# Patient Record
Sex: MALE | Race: WHITE | Employment: FULL TIME | URBAN - METROPOLITAN AREA
[De-identification: names, ages, dates, MRNs, and addresses within clinical notes are randomized per-mention and may not be internally consistent; named-entity substitution may affect disease eponyms.]

---

## 2021-08-07 ENCOUNTER — HOSPITAL ENCOUNTER (OUTPATIENT)
Age: 66
Setting detail: OBSERVATION
Discharge: HOME | End: 2021-08-08
Attending: EMERGENCY MEDICINE | Admitting: SURGERY
Payer: MEDICARE

## 2021-08-07 DIAGNOSIS — T30.0 BURN: Primary | ICD-10-CM

## 2021-08-07 LAB
ABSOLUTE EOS #: 0.09 K/UL (ref 0–0.44)
ABSOLUTE IMMATURE GRANULOCYTE: 0.04 K/UL (ref 0–0.3)
ABSOLUTE LYMPH #: 1.49 K/UL (ref 1.1–3.7)
ABSOLUTE MONO #: 0.65 K/UL (ref 0.1–1.2)
ANION GAP SERPL CALCULATED.3IONS-SCNC: 12 MMOL/L (ref 9–17)
BASOPHILS # BLD: 1 % (ref 0–2)
BASOPHILS ABSOLUTE: 0.04 K/UL (ref 0–0.2)
BUN BLDV-MCNC: 19 MG/DL (ref 8–23)
BUN/CREAT BLD: ABNORMAL (ref 9–20)
CALCIUM SERPL-MCNC: 8.5 MG/DL (ref 8.6–10.4)
CHLORIDE BLD-SCNC: 105 MMOL/L (ref 98–107)
CO2: 21 MMOL/L (ref 20–31)
CREAT SERPL-MCNC: 0.67 MG/DL (ref 0.7–1.2)
DIFFERENTIAL TYPE: ABNORMAL
EOSINOPHILS RELATIVE PERCENT: 1 % (ref 1–4)
GFR AFRICAN AMERICAN: >60 ML/MIN
GFR NON-AFRICAN AMERICAN: >60 ML/MIN
GFR SERPL CREATININE-BSD FRML MDRD: ABNORMAL ML/MIN/{1.73_M2}
GFR SERPL CREATININE-BSD FRML MDRD: ABNORMAL ML/MIN/{1.73_M2}
GLUCOSE BLD-MCNC: 105 MG/DL (ref 70–99)
HCT VFR BLD CALC: 41.4 % (ref 40.7–50.3)
HEMOGLOBIN: 13.1 G/DL (ref 13–17)
IMMATURE GRANULOCYTES: 1 %
LYMPHOCYTES # BLD: 20 % (ref 24–43)
MCH RBC QN AUTO: 31.3 PG (ref 25.2–33.5)
MCHC RBC AUTO-ENTMCNC: 31.6 G/DL (ref 28.4–34.8)
MCV RBC AUTO: 99 FL (ref 82.6–102.9)
MONOCYTES # BLD: 9 % (ref 3–12)
NRBC AUTOMATED: 0 PER 100 WBC
PDW BLD-RTO: 13.2 % (ref 11.8–14.4)
PLATELET # BLD: ABNORMAL K/UL (ref 138–453)
PLATELET ESTIMATE: ABNORMAL
PLATELET, FLUORESCENCE: 130 K/UL (ref 138–453)
PLATELET, IMMATURE FRACTION: 5.8 % (ref 1.1–10.3)
PMV BLD AUTO: ABNORMAL FL (ref 8.1–13.5)
POTASSIUM SERPL-SCNC: 4 MMOL/L (ref 3.7–5.3)
RBC # BLD: 4.18 M/UL (ref 4.21–5.77)
RBC # BLD: ABNORMAL 10*6/UL
SEG NEUTROPHILS: 69 % (ref 36–65)
SEGMENTED NEUTROPHILS ABSOLUTE COUNT: 5.02 K/UL (ref 1.5–8.1)
SODIUM BLD-SCNC: 138 MMOL/L (ref 135–144)
WBC # BLD: 7.3 K/UL (ref 3.5–11.3)
WBC # BLD: ABNORMAL 10*3/UL

## 2021-08-07 PROCEDURE — 80048 BASIC METABOLIC PNL TOTAL CA: CPT

## 2021-08-07 PROCEDURE — 6370000000 HC RX 637 (ALT 250 FOR IP): Performed by: STUDENT IN AN ORGANIZED HEALTH CARE EDUCATION/TRAINING PROGRAM

## 2021-08-07 PROCEDURE — 85055 RETICULATED PLATELET ASSAY: CPT

## 2021-08-07 PROCEDURE — G0378 HOSPITAL OBSERVATION PER HR: HCPCS

## 2021-08-07 PROCEDURE — 2500000003 HC RX 250 WO HCPCS: Performed by: STUDENT IN AN ORGANIZED HEALTH CARE EDUCATION/TRAINING PROGRAM

## 2021-08-07 PROCEDURE — 97530 THERAPEUTIC ACTIVITIES: CPT

## 2021-08-07 PROCEDURE — 16020 DRESS/DEBRID P-THICK BURN S: CPT

## 2021-08-07 PROCEDURE — 96372 THER/PROPH/DIAG INJ SC/IM: CPT

## 2021-08-07 PROCEDURE — 99283 EMERGENCY DEPT VISIT LOW MDM: CPT

## 2021-08-07 PROCEDURE — 6360000002 HC RX W HCPCS: Performed by: STUDENT IN AN ORGANIZED HEALTH CARE EDUCATION/TRAINING PROGRAM

## 2021-08-07 PROCEDURE — 1200000000 HC SEMI PRIVATE

## 2021-08-07 PROCEDURE — 85025 COMPLETE CBC W/AUTO DIFF WBC: CPT

## 2021-08-07 PROCEDURE — 2500000003 HC RX 250 WO HCPCS

## 2021-08-07 PROCEDURE — 97162 PT EVAL MOD COMPLEX 30 MIN: CPT

## 2021-08-07 RX ORDER — AMLODIPINE BESYLATE 5 MG/1
5 TABLET ORAL DAILY
COMMUNITY
Start: 2020-10-19

## 2021-08-07 RX ORDER — FENTANYL CITRATE 50 UG/ML
INJECTION, SOLUTION INTRAMUSCULAR; INTRAVENOUS
Status: DISPENSED
Start: 2021-08-07 | End: 2021-08-07

## 2021-08-07 RX ORDER — POLYETHYLENE GLYCOL 3350 17 G/17G
17 POWDER, FOR SOLUTION ORAL DAILY PRN
Status: DISCONTINUED | OUTPATIENT
Start: 2021-08-07 | End: 2021-08-08 | Stop reason: HOSPADM

## 2021-08-07 RX ORDER — TRAZODONE HYDROCHLORIDE 100 MG/1
100 TABLET ORAL NIGHTLY
Status: DISCONTINUED | OUTPATIENT
Start: 2021-08-07 | End: 2021-08-08

## 2021-08-07 RX ORDER — GINSENG 100 MG
CAPSULE ORAL DAILY
Status: DISCONTINUED | OUTPATIENT
Start: 2021-08-07 | End: 2021-08-08 | Stop reason: HOSPADM

## 2021-08-07 RX ORDER — ASPIRIN 81 MG/1
81 TABLET ORAL
Status: DISCONTINUED | OUTPATIENT
Start: 2021-08-07 | End: 2021-08-08 | Stop reason: HOSPADM

## 2021-08-07 RX ORDER — FENTANYL CITRATE 50 UG/ML
25 INJECTION, SOLUTION INTRAMUSCULAR; INTRAVENOUS
Status: DISCONTINUED | OUTPATIENT
Start: 2021-08-07 | End: 2021-08-08 | Stop reason: HOSPADM

## 2021-08-07 RX ORDER — ONDANSETRON 2 MG/ML
4 INJECTION INTRAMUSCULAR; INTRAVENOUS EVERY 6 HOURS PRN
Status: DISCONTINUED | OUTPATIENT
Start: 2021-08-07 | End: 2021-08-08 | Stop reason: HOSPADM

## 2021-08-07 RX ORDER — IBUPROFEN 400 MG/1
400 TABLET ORAL EVERY 4 HOURS
Status: DISCONTINUED | OUTPATIENT
Start: 2021-08-07 | End: 2021-08-08 | Stop reason: HOSPADM

## 2021-08-07 RX ORDER — FLUTICASONE PROPIONATE 50 MCG
2 SPRAY, SUSPENSION (ML) NASAL DAILY
COMMUNITY
Start: 2020-10-17

## 2021-08-07 RX ORDER — OMEPRAZOLE 20 MG/1
20 CAPSULE, DELAYED RELEASE ORAL DAILY
COMMUNITY
Start: 2020-10-06

## 2021-08-07 RX ORDER — LISINOPRIL 10 MG/1
10 TABLET ORAL DAILY
Status: DISCONTINUED | OUTPATIENT
Start: 2021-08-07 | End: 2021-08-08

## 2021-08-07 RX ORDER — ACETAMINOPHEN 500 MG
1000 TABLET ORAL EVERY 8 HOURS SCHEDULED
Status: DISCONTINUED | OUTPATIENT
Start: 2021-08-07 | End: 2021-08-08 | Stop reason: HOSPADM

## 2021-08-07 RX ORDER — OXYCODONE HYDROCHLORIDE 5 MG/1
5 TABLET ORAL EVERY 6 HOURS PRN
Status: DISCONTINUED | OUTPATIENT
Start: 2021-08-07 | End: 2021-08-08 | Stop reason: HOSPADM

## 2021-08-07 RX ORDER — GABAPENTIN 300 MG/1
300 CAPSULE ORAL EVERY 8 HOURS
Status: DISCONTINUED | OUTPATIENT
Start: 2021-08-07 | End: 2021-08-08 | Stop reason: HOSPADM

## 2021-08-07 RX ORDER — ONDANSETRON 4 MG/1
4 TABLET, ORALLY DISINTEGRATING ORAL EVERY 8 HOURS PRN
Status: DISCONTINUED | OUTPATIENT
Start: 2021-08-07 | End: 2021-08-08 | Stop reason: HOSPADM

## 2021-08-07 RX ADMIN — SILVER SULFADIAZINE: 10 CREAM TOPICAL at 13:53

## 2021-08-07 RX ADMIN — Medication 81 MG: at 05:21

## 2021-08-07 RX ADMIN — SILVER SULFADIAZINE: 10 CREAM TOPICAL at 04:00

## 2021-08-07 RX ADMIN — IBUPROFEN 400 MG: 400 TABLET, FILM COATED ORAL at 10:09

## 2021-08-07 RX ADMIN — ACETAMINOPHEN 1000 MG: 500 TABLET ORAL at 13:52

## 2021-08-07 RX ADMIN — IBUPROFEN 400 MG: 400 TABLET, FILM COATED ORAL at 13:52

## 2021-08-07 RX ADMIN — LISINOPRIL 10 MG: 10 TABLET ORAL at 10:09

## 2021-08-07 RX ADMIN — IBUPROFEN 400 MG: 400 TABLET, FILM COATED ORAL at 23:22

## 2021-08-07 RX ADMIN — ENOXAPARIN SODIUM 30 MG: 30 INJECTION SUBCUTANEOUS at 23:23

## 2021-08-07 RX ADMIN — IBUPROFEN 400 MG: 400 TABLET, FILM COATED ORAL at 17:24

## 2021-08-07 RX ADMIN — GABAPENTIN 300 MG: 300 CAPSULE ORAL at 23:22

## 2021-08-07 RX ADMIN — OXYCODONE HYDROCHLORIDE 5 MG: 5 TABLET ORAL at 10:08

## 2021-08-07 RX ADMIN — IBUPROFEN 400 MG: 400 TABLET, FILM COATED ORAL at 05:21

## 2021-08-07 RX ADMIN — GABAPENTIN 300 MG: 300 CAPSULE ORAL at 10:09

## 2021-08-07 RX ADMIN — OXYCODONE HYDROCHLORIDE 5 MG: 5 TABLET ORAL at 17:23

## 2021-08-07 ASSESSMENT — PAIN DESCRIPTION - LOCATION
LOCATION: ANKLE
LOCATION: ARM
LOCATION: ARM;HAND;LEG

## 2021-08-07 ASSESSMENT — PAIN DESCRIPTION - ORIENTATION
ORIENTATION: LEFT
ORIENTATION: LEFT

## 2021-08-07 ASSESSMENT — PAIN DESCRIPTION - ONSET
ONSET: ON-GOING
ONSET: ON-GOING

## 2021-08-07 ASSESSMENT — PAIN DESCRIPTION - FREQUENCY: FREQUENCY: CONTINUOUS

## 2021-08-07 ASSESSMENT — ENCOUNTER SYMPTOMS
ABDOMINAL PAIN: 0
COUGH: 0
VOMITING: 0
SORE THROAT: 0
NAUSEA: 0
SHORTNESS OF BREATH: 0
BACK PAIN: 0

## 2021-08-07 ASSESSMENT — PAIN SCALES - GENERAL
PAINLEVEL_OUTOF10: 2
PAINLEVEL_OUTOF10: 6
PAINLEVEL_OUTOF10: 6
PAINLEVEL_OUTOF10: 3
PAINLEVEL_OUTOF10: 6
PAINLEVEL_OUTOF10: 7
PAINLEVEL_OUTOF10: 5
PAINLEVEL_OUTOF10: 3

## 2021-08-07 ASSESSMENT — PAIN DESCRIPTION - PAIN TYPE
TYPE: ACUTE PAIN

## 2021-08-07 ASSESSMENT — PAIN - FUNCTIONAL ASSESSMENT: PAIN_FUNCTIONAL_ASSESSMENT: PREVENTS OR INTERFERES SOME ACTIVE ACTIVITIES AND ADLS

## 2021-08-07 ASSESSMENT — PAIN DESCRIPTION - DESCRIPTORS
DESCRIPTORS: NUMBNESS
DESCRIPTORS: ACHING;DISCOMFORT
DESCRIPTORS: BURNING
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
## 2021-08-07 NOTE — ED NOTES
Pt resting in stretcher in no acute distress. States pain is not bad at this time, would not like anything strong. Notified should the pain increase that we have other pain medications available. PO fluids provided.      Jorge Shirley RN  08/07/21 [id number]

## 2021-08-07 NOTE — PROCEDURES
Debridement Note    After explaining the procedure to the patient, along with the risks and benefits, we got verbal consent to proceed. Chlorhexidine soap was mixed with normal saline, and wash clothes were used to thoroughly clean the area of burn on bilateral hands and left foot. The tip of suture kit scissors was used to incise each blister before the burnt skin was peeled away to reveal pink, well-perfused skin below. The area was thoroughly debrided until there were no palpable or visible blisters, and the skin was pink and moist. Silvadene was applied to the wounds and covered with acrylyx gauze. All scissors were accounted for sharps were disposed of properly in the sharps bin. The patient tolerated the procedure well with Fentanyl 25 mcg Q15 mins for the entire procedure.     Juan Diego Ruiz DO PGY-1  8/7/21 5:18 AM

## 2021-08-07 NOTE — PROGRESS NOTES
Date Procedure started:            8/7/21                        Time Procedure started:            1030                          Location Completed:     Bedside      X   Shower Room  Medication Given: Premedicated with Roxicodone,   Photos Taken yes                                                     Please jatin dressing applied to OR debrided injuries/ skin to all areas that apply below:     S=Silvadene    B=Bacitracin    M= Mepilex    F= Furacin        BOGGS=Santyl                             SUL=Sulfamylon     D=Donor site/xeroform    E=Eucerin    O=Other (specify below)  DRESSING APPLICATION/ CHANGE DEBRIDEMENT      (Y/N) BODY LOCATION   HEAD, FACE AND NECK      SCALP      RT EAR     LT EAR     NECK     FACE         CHEST     ABDOMEN       BACK       BUTTOCK       GENITALIA       PERINEUM         RT UPPER ARM (Includes Shoulder)   S N LT UPPER ARM (Includes Shoulder)     RT LOWER ARM (Includes Elbow or Wrist)   S N LT LOWER ARM (Includes Elbow or Wrist)   S N RT HAND (Includes Fingers)   S N LT HAND (Includes Fingers)           RT UPPER LEG (Includes Hip)       LT UPPER LEG (Includes Hip)     RT LOWER LEG (Includes Knee)   S  N LT LOWER LEG (Includes Knee)       RT FOOT (Includes Ankles or Toes)   S  N LT FOOT (Includes Ankles or Toes)      ADDITIONAL NOTES:  Burn dressing changed @ shower room. Pre-medicated with po pain medication Kimmy. Patient is showered  with Hibiclens. Derma net dressings impregnated with Silvadene applied to above mentioned area. Dry gauze applied overtop, Kerlix rolls to secure extremity dressings with Burn net to secure as well. Patient tolerate the dressing process pretty well. Patient Walked back to room without difficulties.

## 2021-08-07 NOTE — CARE COORDINATION
SBIRT- completed          Alcohol Screening and Brief Intervention        No results for input(s): ALC in the last 72 hours. Alcohol Pre-screening  (MEN ONLY) How many times in the past year have you had 5 or more drinks in a day?: None       Alcohol Screening Audit       Drug Pre-Screening   How many times in the past year have you used a recreational drug or used a prescription medication for nonmedical reasons?: None    Drug Screening DAST       Mood Pre-Screening (PHQ-2)  During the past two weeks, have you been bothered by little interest or pleasure in doing things?: No  During the past two weeks, have you been bothered by feeling down, depressed, or hopeless?: No    Mood Pre-Screening (PHQ-9)         I have interviewed Yolanda Sandhu, [phone] regarding  His alcohol consumption/drug use and risk for excessive use. Screenings were negative. Patient  N/A intervention at this time.      Deferred []    Completed on: 8/7/2021   JESIKA MOODY
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
## 2021-08-07 NOTE — PLAN OF CARE
Activity:  Goal: Ability to perform activities at highest level will improve  Description: Ability to perform activities at highest level will improve  8/7/2021 1747 by Golden Jimenez RN  Outcome: Ongoing  8/7/2021 1746 by Golden Jimenez RN  Outcome: Ongoing  Goal: Mobility will improve  Description: Mobility will improve  8/7/2021 1747 by Golden Jimenez RN  Outcome: Ongoing  8/7/2021 1746 by Golden Jimenez RN  Outcome: Ongoing     Problem: Coping:  Goal: Coping behaviors will improve  Description: Coping behaviors will improve  8/7/2021 1747 by Golden Jimenez RN  Outcome: Ongoing  8/7/2021 1746 by Golden Jimenez RN  Outcome: Ongoing  Goal: Verbalizations of decreased anxiety will increase  Description: Verbalizations of decreased anxiety will increase  8/7/2021 1747 by Golden Jimenez RN  Outcome: Ongoing  8/7/2021 1746 by Golden Jimenez RN  Outcome: Ongoing  Goal: Verbalization of a cessation or decrease of fear will increase  Description: Verbalization of a cessation or decrease of fear will increase  8/7/2021 1747 by Golden Jimenez RN  Outcome: Ongoing  8/7/2021 1746 by Golden Jimenez RN  Outcome: Ongoing  Goal: Ability to verbalize positive feelings about self will improve  Description: Ability to verbalize positive feelings about self will improve  8/7/2021 1747 by Golden Jimenez RN  Outcome: Ongoing  8/7/2021 1746 by Golden Jimenez RN  Outcome: Ongoing     Problem: Fluid Volume:  Goal: Will maintain adequate fluid volume  Description: Will maintain adequate fluid volume  8/7/2021 1747 by Golden Jimenez RN  Outcome: Ongoing  8/7/2021 1746 by Golden Jimenez RN  Outcome: Ongoing     Problem: Skin Integrity:  Goal: Signs of wound healing will improve  Description: Signs of wound healing will improve  8/7/2021 1747 by Golden Jimenez RN  Outcome: Ongoing  8/7/2021 1746 by Golden Jimenez RN  Outcome: Ongoing
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
## 2021-08-07 NOTE — PROGRESS NOTES
[employer] FROI form completed and faxed to Utilization Review at [phone] and emailed to [email or web address] . A copy of the form has been placed in the patient's chart and  aware.
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
## 2021-08-07 NOTE — ED NOTES
See Epic charting during debridement for pain medication administration.      Deyvi Cotto RN  08/07/21 5490
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
## 2021-08-08 VITALS
DIASTOLIC BLOOD PRESSURE: 63 MMHG | TEMPERATURE: 98.1 F | HEART RATE: 65 BPM | OXYGEN SATURATION: 96 % | BODY MASS INDEX: 29.8 KG/M2 | RESPIRATION RATE: 14 BRPM | WEIGHT: 220 LBS | HEIGHT: 72 IN | SYSTOLIC BLOOD PRESSURE: 103 MMHG

## 2021-08-08 PROCEDURE — 6370000000 HC RX 637 (ALT 250 FOR IP): Performed by: STUDENT IN AN ORGANIZED HEALTH CARE EDUCATION/TRAINING PROGRAM

## 2021-08-08 PROCEDURE — 97535 SELF CARE MNGMENT TRAINING: CPT

## 2021-08-08 PROCEDURE — G0378 HOSPITAL OBSERVATION PER HR: HCPCS

## 2021-08-08 PROCEDURE — 97110 THERAPEUTIC EXERCISES: CPT

## 2021-08-08 PROCEDURE — 97166 OT EVAL MOD COMPLEX 45 MIN: CPT

## 2021-08-08 PROCEDURE — 2500000003 HC RX 250 WO HCPCS: Performed by: SURGERY

## 2021-08-08 PROCEDURE — 97140 MANUAL THERAPY 1/> REGIONS: CPT

## 2021-08-08 PROCEDURE — 6360000002 HC RX W HCPCS: Performed by: STUDENT IN AN ORGANIZED HEALTH CARE EDUCATION/TRAINING PROGRAM

## 2021-08-08 PROCEDURE — 96372 THER/PROPH/DIAG INJ SC/IM: CPT

## 2021-08-08 RX ORDER — GINSENG 100 MG
CAPSULE ORAL
Qty: 2 TUBE | Refills: 3 | Status: SHIPPED | OUTPATIENT
Start: 2021-08-09 | End: 2021-08-18

## 2021-08-08 RX ORDER — GABAPENTIN 300 MG/1
300 CAPSULE ORAL EVERY 8 HOURS
Qty: 30 CAPSULE | Refills: 0 | Status: SHIPPED | OUTPATIENT
Start: 2021-08-08 | End: 2021-08-24

## 2021-08-08 RX ORDER — OXYCODONE HYDROCHLORIDE 5 MG/1
5 TABLET ORAL EVERY 6 HOURS PRN
Qty: 12 TABLET | Refills: 0 | Status: SHIPPED | OUTPATIENT
Start: 2021-08-08 | End: 2021-08-11

## 2021-08-08 RX ORDER — IBUPROFEN 400 MG/1
400 TABLET ORAL EVERY 4 HOURS
Qty: 120 TABLET | Refills: 3 | Status: SHIPPED | OUTPATIENT
Start: 2021-08-08

## 2021-08-08 RX ORDER — CALCIUM CARBONATE 200(500)MG
500 TABLET,CHEWABLE ORAL ONCE
Status: COMPLETED | OUTPATIENT
Start: 2021-08-08 | End: 2021-08-08

## 2021-08-08 RX ADMIN — IBUPROFEN 400 MG: 400 TABLET, FILM COATED ORAL at 08:19

## 2021-08-08 RX ADMIN — ACETAMINOPHEN 1000 MG: 500 TABLET ORAL at 08:18

## 2021-08-08 RX ADMIN — OXYCODONE HYDROCHLORIDE 5 MG: 5 TABLET ORAL at 14:19

## 2021-08-08 RX ADMIN — GABAPENTIN 300 MG: 300 CAPSULE ORAL at 06:44

## 2021-08-08 RX ADMIN — ANTACID TABLETS 500 MG: 500 TABLET, CHEWABLE ORAL at 01:10

## 2021-08-08 RX ADMIN — OXYCODONE HYDROCHLORIDE 5 MG: 5 TABLET ORAL at 00:40

## 2021-08-08 RX ADMIN — IBUPROFEN 400 MG: 400 TABLET, FILM COATED ORAL at 12:51

## 2021-08-08 RX ADMIN — ACETAMINOPHEN 1000 MG: 500 TABLET ORAL at 00:37

## 2021-08-08 RX ADMIN — GABAPENTIN 300 MG: 300 CAPSULE ORAL at 14:19

## 2021-08-08 RX ADMIN — SILVER SULFADIAZINE: 10 CREAM TOPICAL at 14:20

## 2021-08-08 RX ADMIN — OXYCODONE HYDROCHLORIDE 5 MG: 5 TABLET ORAL at 08:18

## 2021-08-08 RX ADMIN — ENOXAPARIN SODIUM 30 MG: 30 INJECTION SUBCUTANEOUS at 08:18

## 2021-08-08 RX ADMIN — ACETAMINOPHEN 1000 MG: 500 TABLET ORAL at 14:19

## 2021-08-08 ASSESSMENT — PAIN SCALES - GENERAL
PAINLEVEL_OUTOF10: 5
PAINLEVEL_OUTOF10: 7
PAINLEVEL_OUTOF10: 6
PAINLEVEL_OUTOF10: 3

## 2021-08-08 ASSESSMENT — PAIN DESCRIPTION - LOCATION: LOCATION: HAND

## 2021-08-08 ASSESSMENT — PAIN DESCRIPTION - PAIN TYPE: TYPE: ACUTE PAIN

## 2021-08-08 ASSESSMENT — PAIN DESCRIPTION - ORIENTATION: ORIENTATION: RIGHT;LEFT

## 2021-08-08 NOTE — PROGRESS NOTES
Date Procedure started:            8/8/21                        Time Procedure started:          1430                          Location Completed:   X  Bedside         Shower Room  Medication Given: Premedicated with Roxicodone,   Photos Taken No                                                     Please jatin dressing applied to OR debrided injuries/ skin to all areas that apply below:     S=Silvadene    B=Bacitracin    M= Mepilex    F= Furacin        BOGGS=Santyl                             SUL=Sulfamylon     D=Donor site/xeroform    E=Eucerin    O=Other (specify below)  DRESSING APPLICATION/ CHANGE DEBRIDEMENT      (Y/N) BODY LOCATION   HEAD, FACE AND NECK       SCALP       RT EAR       LT EAR       NECK       FACE           CHEST       ABDOMEN       BACK       BUTTOCK       GENITALIA       PERINEUM           RT UPPER ARM (Includes Shoulder)   S N LT UPPER ARM (Includes Shoulder)       RT LOWER ARM (Includes Elbow or Wrist)   S N LT LOWER ARM (Includes Elbow or Wrist)   S N RT HAND (Includes Fingers)   S N LT HAND (Includes Fingers)           RT UPPER LEG (Includes Hip)       LT UPPER LEG (Includes Hip)       RT LOWER LEG (Includes Knee)   S  N LT LOWER LEG (Includes Knee)       RT FOOT (Includes Ankles or Toes)   S  N LT FOOT (Includes Ankles or Toes)      ADDITIONAL NOTES:  Burn dressing changed @ bedside. Pre-medicated with po pain medication Kimmy. Patient is washed with Hibiclens. Derma net dressings impregnated with Silvadene applied to above mentioned area. Dry gauze applied overtop, Kerlix rolls to secure extremity dressings with Burn net to secure as well. Patient tolerate the dressing process pretty well. Wife is at bedside learning the whole process.
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
## 2021-08-08 NOTE — FLOWSHEET NOTE
08/08/21 1140   Encounter Summary   Services provided to: Patient   Referral/Consult From: Family   Support System Spouse; Pentecostalism/deborah community;Friends/neighbors   Place of [facility] [address] [place]   Contact Pentecostalism No   Continue Visiting   (8/8/21)   Complexity of Encounter Low   Length of Encounter 15 minutes   Spiritual Assessment Completed Yes   Routine   Type Follow up   Assessment Calm; Approachable;Coping   Intervention Active listening;Explored feelings, thoughts, concerns;Nurtured hope;Prayer;Sustaining presence/ Ministry of presence; Discussed belief system/Sikhism practices/deborah   Outcome Acceptance;Comfort;Expressed gratitude
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
## 2021-08-08 NOTE — PROGRESS NOTES
Patient is discharged to home after dressing change. IV removed. Patient is given dressing supplies for two days, discharge instructions given to patient and wife. Questions are answered, patient left unit with wheel chair.

## 2021-08-08 NOTE — PLAN OF CARE
adequate fluid volume  Description: Will maintain adequate fluid volume  8/8/2021 1402 by Lakisha Degroot RN  Outcome: Ongoing  8/8/2021 0249 by Gaetano Liu RN  Outcome: Ongoing     Problem: Skin Integrity:  Goal: Signs of wound healing will improve  Description: Signs of wound healing will improve  8/8/2021 1402 by Lakisha Degroot RN  Outcome: Ongoing  8/8/2021 0249 by Gaetano Liu RN  Outcome: Ongoing

## 2021-08-08 NOTE — PLAN OF CARE
Ongoing  8/7/2021 1747 by Jose Angel Scott RN  Outcome: Ongoing  8/7/2021 1746 by Jose Angel Scott RN  Outcome: Ongoing  Goal: Ability to verbalize positive feelings about self will improve  Description: Ability to verbalize positive feelings about self will improve  8/8/2021 0249 by Iza Brasher RN  Outcome: Ongoing  8/7/2021 1747 by Jose Angel Scott RN  Outcome: Ongoing  8/7/2021 1746 by Jose Angel Scott RN  Outcome: Ongoing     Problem: Fluid Volume:  Goal: Will maintain adequate fluid volume  Description: Will maintain adequate fluid volume  8/8/2021 0249 by Iza Brasher RN  Outcome: Ongoing  8/7/2021 1747 by Jose Angel Scott RN  Outcome: Ongoing  8/7/2021 1746 by Jose Angel Scott RN  Outcome: Ongoing     Problem: Skin Integrity:  Goal: Signs of wound healing will improve  Description: Signs of wound healing will improve  8/8/2021 0249 by Iza Brasher RN  Outcome: Ongoing  8/7/2021 1747 by Jose Angel Scott RN  Outcome: Ongoing  8/7/2021 1746 by Jose Angel Scott RN  Outcome: Ongoing
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
## 2021-08-10 ENCOUNTER — OFFICE VISIT (OUTPATIENT)
Dept: BURN CARE | Age: 66
End: 2021-08-10
Payer: MEDICARE

## 2021-08-10 VITALS
HEIGHT: 72 IN | SYSTOLIC BLOOD PRESSURE: 121 MMHG | WEIGHT: 220 LBS | BODY MASS INDEX: 29.8 KG/M2 | DIASTOLIC BLOOD PRESSURE: 76 MMHG | HEART RATE: 76 BPM

## 2021-08-10 DIAGNOSIS — T30.0 BURN: Primary | ICD-10-CM

## 2021-08-10 PROCEDURE — 1123F ACP DISCUSS/DSCN MKR DOCD: CPT | Performed by: PLASTIC SURGERY

## 2021-08-10 PROCEDURE — 4040F PNEUMOC VAC/ADMIN/RCVD: CPT | Performed by: PLASTIC SURGERY

## 2021-08-10 PROCEDURE — G8427 DOCREV CUR MEDS BY ELIG CLIN: HCPCS | Performed by: PLASTIC SURGERY

## 2021-08-10 PROCEDURE — G8417 CALC BMI ABV UP PARAM F/U: HCPCS | Performed by: PLASTIC SURGERY

## 2021-08-10 PROCEDURE — 99202 OFFICE O/P NEW SF 15 MIN: CPT

## 2021-08-10 PROCEDURE — 1036F TOBACCO NON-USER: CPT | Performed by: PLASTIC SURGERY

## 2021-08-10 PROCEDURE — 3017F COLORECTAL CA SCREEN DOC REV: CPT | Performed by: PLASTIC SURGERY

## 2021-08-10 PROCEDURE — 99213 OFFICE O/P EST LOW 20 MIN: CPT | Performed by: PLASTIC SURGERY

## 2021-08-10 RX ORDER — GAUZE BANDAGE 4.5"X147"
BANDAGE TOPICAL
Qty: 60 EACH | Refills: 2 | Status: ON HOLD | OUTPATIENT
Start: 2021-08-10 | End: 2021-09-09

## 2021-08-10 RX ORDER — CEPHALEXIN 500 MG/1
500 CAPSULE ORAL 4 TIMES DAILY
Qty: 21 CAPSULE | Refills: 0 | Status: SHIPPED | OUTPATIENT
Start: 2021-08-10 | End: 2021-08-17

## 2021-08-10 RX ORDER — OXYCODONE HYDROCHLORIDE AND ACETAMINOPHEN 5; 325 MG/1; MG/1
1 TABLET ORAL EVERY 6 HOURS PRN
Qty: 16 TABLET | Refills: 0 | Status: SHIPPED | OUTPATIENT
Start: 2021-08-10 | End: 2021-08-14

## 2021-08-10 RX ADMIN — Medication: at 10:25
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
## 2021-08-13 DIAGNOSIS — T30.0 BURN: ICD-10-CM

## 2021-08-13 RX ORDER — OXYCODONE HYDROCHLORIDE AND ACETAMINOPHEN 5; 325 MG/1; MG/1
1 TABLET ORAL EVERY 6 HOURS PRN
Qty: 16 TABLET | Refills: 0 | Status: CANCELLED | OUTPATIENT
Start: 2021-08-13 | End: 2021-08-17

## 2021-08-13 NOTE — TELEPHONE ENCOUNTER
Pt wife called asking can some other medication be prescribed for pain to the [address] [place] [address] in [place]. He is having vivid dreams and very bad hallucinations from the Oxycodone 5-325 MG.        Last Visit Date (If Applicable):  2/68/8589    Next Visit Date:    8/17/2021

## 2021-08-16 ENCOUNTER — TELEPHONE (OUTPATIENT)
Dept: BURN CARE | Age: 66
End: 2021-08-16

## 2021-08-16 NOTE — TELEPHONE ENCOUNTER
PT CALLED STATING THAT HE IS IN A LOT OF PAIN AND CAN BARELY STAND DRESSING CHANGES AND FOOT IS SWELLED. PT REQUESTED PAIN MEDS. EXPLAINED TO PT THAT IF HE IS IN A LOT OF PAIN AND THE SWELLING IS BAD TO GO TO THE ED FOR EVALUATION THAT OUR DOCS ARE ONLY HERE ON TUES AM AND WE CANNOT PRESCRIBED PAIN MEDS UNTIL SEEN IN OFFICE. PT ASKED TO CALL PRIVATE OFFICE FOR ADVICE, TOLD PT THAT THE PRIVATE OFFICE WILL REDIRECT HIM TO OUR OFFICE BECAUSE HE IS OUR PT. I ALSO ADVISED PT THAT IF HE CAN ONLY STAND TO DO ONE DRESSING CHANGE A DAY THAT IS WAS OK WE PREFER TWICE DAILY BUT IF PAIN IS THAT BAD ONCE DAILY IS OK TILL SEEN. PT VERBALIZED UNDERSTANDING.

## 2021-08-17 ENCOUNTER — OFFICE VISIT (OUTPATIENT)
Dept: BURN CARE | Age: 66
End: 2021-08-17
Payer: MEDICARE

## 2021-08-17 VITALS
DIASTOLIC BLOOD PRESSURE: 90 MMHG | WEIGHT: 220 LBS | BODY MASS INDEX: 29.8 KG/M2 | HEIGHT: 72 IN | SYSTOLIC BLOOD PRESSURE: 160 MMHG | HEART RATE: 58 BPM

## 2021-08-17 DIAGNOSIS — T30.0 BURN: Primary | ICD-10-CM

## 2021-08-17 PROCEDURE — 3017F COLORECTAL CA SCREEN DOC REV: CPT | Performed by: PLASTIC SURGERY

## 2021-08-17 PROCEDURE — 4040F PNEUMOC VAC/ADMIN/RCVD: CPT | Performed by: PLASTIC SURGERY

## 2021-08-17 PROCEDURE — G8427 DOCREV CUR MEDS BY ELIG CLIN: HCPCS | Performed by: PLASTIC SURGERY

## 2021-08-17 PROCEDURE — 99212 OFFICE O/P EST SF 10 MIN: CPT | Performed by: PLASTIC SURGERY

## 2021-08-17 PROCEDURE — 1036F TOBACCO NON-USER: CPT | Performed by: PLASTIC SURGERY

## 2021-08-17 PROCEDURE — 99212 OFFICE O/P EST SF 10 MIN: CPT

## 2021-08-17 PROCEDURE — G8417 CALC BMI ABV UP PARAM F/U: HCPCS | Performed by: PLASTIC SURGERY

## 2021-08-17 PROCEDURE — 1123F ACP DISCUSS/DSCN MKR DOCD: CPT | Performed by: PLASTIC SURGERY

## 2021-08-17 RX ORDER — GAUZE BANDAGE 4.5"X147"
BANDAGE TOPICAL
Qty: 20 EACH | Refills: 2 | OUTPATIENT
Start: 2021-08-17

## 2021-08-17 RX ORDER — GABAPENTIN 300 MG/1
300 CAPSULE ORAL 3 TIMES DAILY
Qty: 45 CAPSULE | Refills: 0 | Status: SHIPPED | OUTPATIENT
Start: 2021-08-17 | End: 2021-10-26 | Stop reason: ALTCHOICE

## 2021-08-17 RX ORDER — GAUZE BANDAGE 4.5"X147"
BANDAGE TOPICAL
Qty: 60 EACH | Refills: 2 | Status: ON HOLD | OUTPATIENT
Start: 2021-08-17 | End: 2021-09-09

## 2021-08-17 RX ORDER — OXYCODONE HYDROCHLORIDE 5 MG/1
5 TABLET ORAL EVERY 6 HOURS PRN
Qty: 12 TABLET | Refills: 0 | Status: SHIPPED | OUTPATIENT
Start: 2021-08-17 | End: 2021-08-20

## 2021-08-17 RX ORDER — GINSENG 100 MG
CAPSULE ORAL ONCE
Status: COMPLETED | OUTPATIENT
Start: 2021-08-17 | End: 2021-08-17

## 2021-08-17 RX ADMIN — Medication: at 10:32

## 2021-08-17 RX ADMIN — Medication: at 10:31

## 2021-08-17 RX ADMIN — Medication: at 10:27

## 2021-08-17 RX ADMIN — Medication: at 10:26
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
## 2021-08-24 ENCOUNTER — OFFICE VISIT (OUTPATIENT)
Dept: BURN CARE | Age: 66
End: 2021-08-24
Payer: MEDICARE

## 2021-08-24 VITALS
BODY MASS INDEX: 29.8 KG/M2 | SYSTOLIC BLOOD PRESSURE: 151 MMHG | WEIGHT: 220 LBS | HEIGHT: 72 IN | DIASTOLIC BLOOD PRESSURE: 96 MMHG | HEART RATE: 60 BPM

## 2021-08-24 DIAGNOSIS — T30.0 BURN: Primary | ICD-10-CM

## 2021-08-24 PROCEDURE — 99212 OFFICE O/P EST SF 10 MIN: CPT | Performed by: PLASTIC SURGERY

## 2021-08-24 PROCEDURE — 1036F TOBACCO NON-USER: CPT | Performed by: PLASTIC SURGERY

## 2021-08-24 PROCEDURE — G8427 DOCREV CUR MEDS BY ELIG CLIN: HCPCS | Performed by: PLASTIC SURGERY

## 2021-08-24 PROCEDURE — 1123F ACP DISCUSS/DSCN MKR DOCD: CPT | Performed by: PLASTIC SURGERY

## 2021-08-24 PROCEDURE — G8417 CALC BMI ABV UP PARAM F/U: HCPCS | Performed by: PLASTIC SURGERY

## 2021-08-24 PROCEDURE — 99212 OFFICE O/P EST SF 10 MIN: CPT

## 2021-08-24 PROCEDURE — 3017F COLORECTAL CA SCREEN DOC REV: CPT | Performed by: PLASTIC SURGERY

## 2021-08-24 PROCEDURE — 4040F PNEUMOC VAC/ADMIN/RCVD: CPT | Performed by: PLASTIC SURGERY

## 2021-08-24 RX ORDER — CEPHALEXIN 500 MG/1
500 CAPSULE ORAL 4 TIMES DAILY
Qty: 28 CAPSULE | Refills: 0 | Status: SHIPPED | OUTPATIENT
Start: 2021-08-24 | End: 2021-08-31

## 2021-08-24 RX ORDER — CEPHALEXIN 500 MG/1
500 CAPSULE ORAL 3 TIMES DAILY
Qty: 15 CAPSULE | Refills: 0 | Status: SHIPPED | OUTPATIENT
Start: 2021-08-24 | End: 2021-08-29

## 2021-08-24 RX ADMIN — Medication: at 09:02
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
## 2021-08-25 ENCOUNTER — HOSPITAL ENCOUNTER (EMERGENCY)
Age: 66
Discharge: HOME | End: 2021-08-25
Attending: EMERGENCY MEDICINE
Payer: MEDICARE

## 2021-08-25 ENCOUNTER — TELEPHONE (OUTPATIENT)
Dept: BURN CARE | Age: 66
End: 2021-08-25

## 2021-08-25 VITALS
DIASTOLIC BLOOD PRESSURE: 95 MMHG | HEIGHT: 72 IN | SYSTOLIC BLOOD PRESSURE: 177 MMHG | RESPIRATION RATE: 16 BRPM | WEIGHT: 220 LBS | BODY MASS INDEX: 29.8 KG/M2 | OXYGEN SATURATION: 98 % | TEMPERATURE: 98.4 F | HEART RATE: 70 BPM

## 2021-08-25 DIAGNOSIS — T30.0 BURN: Primary | ICD-10-CM

## 2021-08-25 LAB
ABSOLUTE EOS #: 0.21 K/UL (ref 0–0.44)
ABSOLUTE IMMATURE GRANULOCYTE: 0.03 K/UL (ref 0–0.3)
ABSOLUTE LYMPH #: 1.52 K/UL (ref 1.1–3.7)
ABSOLUTE MONO #: 0.66 K/UL (ref 0.1–1.2)
ANION GAP SERPL CALCULATED.3IONS-SCNC: 10 MMOL/L (ref 9–17)
BASOPHILS # BLD: 1 % (ref 0–2)
BASOPHILS ABSOLUTE: 0.07 K/UL (ref 0–0.2)
BUN BLDV-MCNC: 27 MG/DL (ref 8–23)
BUN/CREAT BLD: ABNORMAL (ref 9–20)
C-REACTIVE PROTEIN: 5.7 MG/L (ref 0–5)
CALCIUM SERPL-MCNC: 9.8 MG/DL (ref 8.6–10.4)
CHLORIDE BLD-SCNC: 104 MMOL/L (ref 98–107)
CO2: 24 MMOL/L (ref 20–31)
CREAT SERPL-MCNC: 1.18 MG/DL (ref 0.7–1.2)
DIFFERENTIAL TYPE: ABNORMAL
EOSINOPHILS RELATIVE PERCENT: 2 % (ref 1–4)
GFR AFRICAN AMERICAN: >60 ML/MIN
GFR NON-AFRICAN AMERICAN: >60 ML/MIN
GFR SERPL CREATININE-BSD FRML MDRD: ABNORMAL ML/MIN/{1.73_M2}
GFR SERPL CREATININE-BSD FRML MDRD: ABNORMAL ML/MIN/{1.73_M2}
GLUCOSE BLD-MCNC: 98 MG/DL (ref 70–99)
HCT VFR BLD CALC: 40.6 % (ref 40.7–50.3)
HEMOGLOBIN: 13.2 G/DL (ref 13–17)
IMMATURE GRANULOCYTES: 0 %
LYMPHOCYTES # BLD: 17 % (ref 24–43)
MCH RBC QN AUTO: 31.2 PG (ref 25.2–33.5)
MCHC RBC AUTO-ENTMCNC: 32.5 G/DL (ref 28.4–34.8)
MCV RBC AUTO: 96 FL (ref 82.6–102.9)
MONOCYTES # BLD: 7 % (ref 3–12)
NRBC AUTOMATED: 0 PER 100 WBC
PDW BLD-RTO: 12.6 % (ref 11.8–14.4)
PLATELET # BLD: 398 K/UL (ref 138–453)
PLATELET ESTIMATE: ABNORMAL
PMV BLD AUTO: 9.5 FL (ref 8.1–13.5)
POTASSIUM SERPL-SCNC: 4.7 MMOL/L (ref 3.7–5.3)
RBC # BLD: 4.23 M/UL (ref 4.21–5.77)
RBC # BLD: ABNORMAL 10*6/UL
SEDIMENTATION RATE, ERYTHROCYTE: 20 MM (ref 0–20)
SEG NEUTROPHILS: 73 % (ref 36–65)
SEGMENTED NEUTROPHILS ABSOLUTE COUNT: 6.68 K/UL (ref 1.5–8.1)
SODIUM BLD-SCNC: 138 MMOL/L (ref 135–144)
WBC # BLD: 9.2 K/UL (ref 3.5–11.3)
WBC # BLD: ABNORMAL 10*3/UL

## 2021-08-25 PROCEDURE — 2500000003 HC RX 250 WO HCPCS: Performed by: STUDENT IN AN ORGANIZED HEALTH CARE EDUCATION/TRAINING PROGRAM

## 2021-08-25 PROCEDURE — 96374 THER/PROPH/DIAG INJ IV PUSH: CPT

## 2021-08-25 PROCEDURE — 6360000002 HC RX W HCPCS: Performed by: STUDENT IN AN ORGANIZED HEALTH CARE EDUCATION/TRAINING PROGRAM

## 2021-08-25 PROCEDURE — 96375 TX/PRO/DX INJ NEW DRUG ADDON: CPT

## 2021-08-25 PROCEDURE — 80048 BASIC METABOLIC PNL TOTAL CA: CPT

## 2021-08-25 PROCEDURE — 99285 EMERGENCY DEPT VISIT HI MDM: CPT

## 2021-08-25 PROCEDURE — 85652 RBC SED RATE AUTOMATED: CPT

## 2021-08-25 PROCEDURE — 85025 COMPLETE CBC W/AUTO DIFF WBC: CPT

## 2021-08-25 PROCEDURE — 86140 C-REACTIVE PROTEIN: CPT

## 2021-08-25 PROCEDURE — 93971 EXTREMITY STUDY: CPT

## 2021-08-25 RX ORDER — FENTANYL CITRATE 50 UG/ML
50 INJECTION, SOLUTION INTRAMUSCULAR; INTRAVENOUS ONCE
Status: COMPLETED | OUTPATIENT
Start: 2021-08-25 | End: 2021-08-25

## 2021-08-25 RX ORDER — HYDROCODONE BITARTRATE AND ACETAMINOPHEN 5; 325 MG/1; MG/1
1 TABLET ORAL EVERY 6 HOURS PRN
Qty: 12 TABLET | Refills: 0 | Status: SHIPPED | OUTPATIENT
Start: 2021-08-25 | End: 2021-08-28

## 2021-08-25 RX ORDER — MORPHINE SULFATE 4 MG/ML
4 INJECTION, SOLUTION INTRAMUSCULAR; INTRAVENOUS ONCE
Status: COMPLETED | OUTPATIENT
Start: 2021-08-25 | End: 2021-08-25

## 2021-08-25 RX ADMIN — SILVER SULFADIAZINE: 10 CREAM TOPICAL at 19:48

## 2021-08-25 RX ADMIN — FENTANYL CITRATE 50 MCG: 50 INJECTION, SOLUTION INTRAMUSCULAR; INTRAVENOUS at 16:58

## 2021-08-25 RX ADMIN — FENTANYL CITRATE 50 MCG: 50 INJECTION, SOLUTION INTRAMUSCULAR; INTRAVENOUS at 17:23

## 2021-08-25 RX ADMIN — MORPHINE SULFATE 4 MG: 4 INJECTION, SOLUTION INTRAMUSCULAR; INTRAVENOUS at 19:24

## 2021-08-25 ASSESSMENT — PAIN SCALES - GENERAL
PAINLEVEL_OUTOF10: 6
PAINLEVEL_OUTOF10: 8
PAINLEVEL_OUTOF10: 10

## 2021-08-25 ASSESSMENT — PAIN DESCRIPTION - LOCATION: LOCATION: LEG

## 2021-08-25 ASSESSMENT — PAIN DESCRIPTION - ORIENTATION: ORIENTATION: LEFT

## 2021-08-25 ASSESSMENT — PAIN DESCRIPTION - PAIN TYPE: TYPE: ACUTE PAIN
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
## 2021-08-25 NOTE — TELEPHONE ENCOUNTER
Patient is scheduled for surgery on 9/9 at 7:30AM and needs to arrive at 6:00AM at the surgery center, npo after midnight. Also scheduled for pre admit testing on 8/31 at 9:00AM at [facility]. Left voicemail to call back to confirm.
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
## 2021-08-25 NOTE — TELEPHONE ENCOUNTER
Wife phoned clinic and stated that patient's leg is swollen twice the size, hot to the touch and he's in excruciating pain. VARGHESE Lee advised wife that patient should be evaluated at the ER for his condition. She began questioning KADIA about his surgery. KADIA explained if she hasn't heard about the surgery then she should expect this call soon. She questioned if the surgery would still be possible if it was infected and VARGHESE advised her this should be a question for the ER.
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
## 2021-08-26 RX ORDER — SODIUM CHLORIDE, SODIUM LACTATE, POTASSIUM CHLORIDE, CALCIUM CHLORIDE 600; 310; 30; 20 MG/100ML; MG/100ML; MG/100ML; MG/100ML
1000 INJECTION, SOLUTION INTRAVENOUS CONTINUOUS
Status: CANCELLED | OUTPATIENT
Start: 2021-08-26

## 2021-08-26 ASSESSMENT — ENCOUNTER SYMPTOMS
BLOOD IN STOOL: 0
COUGH: 0
NAUSEA: 0
VOMITING: 0
WHEEZING: 0
SHORTNESS OF BREATH: 0
ABDOMINAL PAIN: 0
SORE THROAT: 0
CONSTIPATION: 0
DIARRHEA: 0
RHINORRHEA: 0

## 2021-08-26 NOTE — TELEPHONE ENCOUNTER
Patient's spouse did call back. Pre admit testing was changed to 8/31 at 9:00AM and patient has been vaccinated so covid testing was cancelled. Surgery was confirmed and verbalized understanding.

## 2021-08-31 ENCOUNTER — HOSPITAL ENCOUNTER (OUTPATIENT)
Dept: PREADMISSION TESTING | Age: 66
Discharge: HOME | End: 2021-09-04
Payer: MEDICARE

## 2021-08-31 ENCOUNTER — OFFICE VISIT (OUTPATIENT)
Dept: BURN CARE | Age: 66
End: 2021-08-31
Payer: MEDICARE

## 2021-08-31 VITALS
TEMPERATURE: 97.4 F | DIASTOLIC BLOOD PRESSURE: 89 MMHG | RESPIRATION RATE: 20 BRPM | WEIGHT: 225 LBS | HEIGHT: 72 IN | OXYGEN SATURATION: 97 % | BODY MASS INDEX: 30.48 KG/M2 | SYSTOLIC BLOOD PRESSURE: 155 MMHG | HEART RATE: 55 BPM

## 2021-08-31 VITALS
DIASTOLIC BLOOD PRESSURE: 96 MMHG | SYSTOLIC BLOOD PRESSURE: 177 MMHG | BODY MASS INDEX: 29.8 KG/M2 | WEIGHT: 220 LBS | HEIGHT: 72 IN | HEART RATE: 61 BPM

## 2021-08-31 DIAGNOSIS — T30.0 BURN: Primary | ICD-10-CM

## 2021-08-31 PROCEDURE — 93005 ELECTROCARDIOGRAM TRACING: CPT | Performed by: ANESTHESIOLOGY

## 2021-08-31 PROCEDURE — 4040F PNEUMOC VAC/ADMIN/RCVD: CPT | Performed by: PLASTIC SURGERY

## 2021-08-31 PROCEDURE — G8427 DOCREV CUR MEDS BY ELIG CLIN: HCPCS | Performed by: PLASTIC SURGERY

## 2021-08-31 PROCEDURE — G8417 CALC BMI ABV UP PARAM F/U: HCPCS | Performed by: PLASTIC SURGERY

## 2021-08-31 PROCEDURE — 3017F COLORECTAL CA SCREEN DOC REV: CPT | Performed by: PLASTIC SURGERY

## 2021-08-31 PROCEDURE — 1036F TOBACCO NON-USER: CPT | Performed by: PLASTIC SURGERY

## 2021-08-31 PROCEDURE — 99213 OFFICE O/P EST LOW 20 MIN: CPT | Performed by: PLASTIC SURGERY

## 2021-08-31 PROCEDURE — 1123F ACP DISCUSS/DSCN MKR DOCD: CPT | Performed by: PLASTIC SURGERY

## 2021-08-31 RX ORDER — HYDROCODONE BITARTRATE AND ACETAMINOPHEN 5; 325 MG/1; MG/1
1 TABLET ORAL EVERY 6 HOURS PRN
Status: ON HOLD | COMMUNITY
End: 2021-09-09

## 2021-08-31 RX ADMIN — Medication: at 08:53

## 2021-08-31 ASSESSMENT — PAIN DESCRIPTION - LOCATION: LOCATION: FOOT;ANKLE

## 2021-08-31 ASSESSMENT — PAIN DESCRIPTION - ORIENTATION: ORIENTATION: LEFT

## 2021-08-31 ASSESSMENT — PAIN DESCRIPTION - FREQUENCY: FREQUENCY: INTERMITTENT

## 2021-08-31 ASSESSMENT — PAIN DESCRIPTION - PAIN TYPE: TYPE: ACUTE PAIN

## 2021-08-31 ASSESSMENT — PAIN SCALES - GENERAL: PAINLEVEL_OUTOF10: 5
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
## 2021-08-31 NOTE — PROGRESS NOTES
Anesthesia contacted:   Yes    I spoke with Dr. Dunaway Favorite. Patient history and pertinent findings reviewed. Patient with no cardiac history with abnormal EKG today. Patient reports no previous EKG or cardiac workup, does not follow with a cardiologist.     Medical or cardiac clearance ordered: Yes, medical clearance with cardiac attention.     DANNY Vergara - CNP   8/31/21  10:27 AM
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
## 2021-09-01 LAB
EKG ATRIAL RATE: 52 BPM
EKG P AXIS: 1 DEGREES
EKG P-R INTERVAL: 232 MS
EKG Q-T INTERVAL: 428 MS
EKG QRS DURATION: 100 MS
EKG QTC CALCULATION (BAZETT): 398 MS
EKG R AXIS: -18 DEGREES
EKG T AXIS: 5 DEGREES
EKG VENTRICULAR RATE: 52 BPM

## 2021-09-01 PROCEDURE — 93010 ELECTROCARDIOGRAM REPORT: CPT | Performed by: INTERNAL MEDICINE

## 2021-09-08 ENCOUNTER — ANESTHESIA EVENT (OUTPATIENT)
Dept: OPERATING ROOM | Age: 66
DRG: 929 | End: 2021-09-08
Payer: MEDICARE

## 2021-09-09 ENCOUNTER — ANESTHESIA (OUTPATIENT)
Dept: OPERATING ROOM | Age: 66
DRG: 929 | End: 2021-09-09
Payer: MEDICARE

## 2021-09-09 ENCOUNTER — HOSPITAL ENCOUNTER (INPATIENT)
Age: 66
LOS: 5 days | Discharge: HOME | DRG: 929 | End: 2021-09-14
Attending: PLASTIC SURGERY | Admitting: PLASTIC SURGERY
Payer: MEDICARE

## 2021-09-09 VITALS — OXYGEN SATURATION: 89 % | TEMPERATURE: 95.9 F | DIASTOLIC BLOOD PRESSURE: 87 MMHG | SYSTOLIC BLOOD PRESSURE: 123 MMHG

## 2021-09-09 DIAGNOSIS — G89.18 POST-OP PAIN: ICD-10-CM

## 2021-09-09 DIAGNOSIS — T30.0 BURN: Primary | ICD-10-CM

## 2021-09-09 PROCEDURE — 7100000001 HC PACU RECOVERY - ADDTL 15 MIN: Performed by: PLASTIC SURGERY

## 2021-09-09 PROCEDURE — 2580000003 HC RX 258: Performed by: PODIATRIST

## 2021-09-09 PROCEDURE — 0HRNX74 REPLACEMENT OF LEFT FOOT SKIN WITH AUTOLOGOUS TISSUE SUBSTITUTE, PARTIAL THICKNESS, EXTERNAL APPROACH: ICD-10-PCS | Performed by: PLASTIC SURGERY

## 2021-09-09 PROCEDURE — 3600000004 HC SURGERY LEVEL 4 BASE: Performed by: PLASTIC SURGERY

## 2021-09-09 PROCEDURE — 1200000000 HC SEMI PRIVATE

## 2021-09-09 PROCEDURE — 2580000003 HC RX 258: Performed by: PLASTIC SURGERY

## 2021-09-09 PROCEDURE — 3600000014 HC SURGERY LEVEL 4 ADDTL 15MIN: Performed by: PLASTIC SURGERY

## 2021-09-09 PROCEDURE — 7100000000 HC PACU RECOVERY - FIRST 15 MIN: Performed by: PLASTIC SURGERY

## 2021-09-09 PROCEDURE — 6360000002 HC RX W HCPCS: Performed by: PLASTIC SURGERY

## 2021-09-09 PROCEDURE — 2580000003 HC RX 258: Performed by: ANESTHESIOLOGY

## 2021-09-09 PROCEDURE — 6360000002 HC RX W HCPCS

## 2021-09-09 PROCEDURE — 2500000003 HC RX 250 WO HCPCS

## 2021-09-09 PROCEDURE — 6370000000 HC RX 637 (ALT 250 FOR IP): Performed by: PODIATRIST

## 2021-09-09 PROCEDURE — 6360000002 HC RX W HCPCS: Performed by: ANESTHESIOLOGY

## 2021-09-09 PROCEDURE — 3700000001 HC ADD 15 MINUTES (ANESTHESIA): Performed by: PLASTIC SURGERY

## 2021-09-09 PROCEDURE — 3700000000 HC ANESTHESIA ATTENDED CARE: Performed by: PLASTIC SURGERY

## 2021-09-09 PROCEDURE — 2709999900 HC NON-CHARGEABLE SUPPLY: Performed by: PLASTIC SURGERY

## 2021-09-09 PROCEDURE — 2500000003 HC RX 250 WO HCPCS: Performed by: PLASTIC SURGERY

## 2021-09-09 RX ORDER — MORPHINE SULFATE 4 MG/ML
4 INJECTION, SOLUTION INTRAMUSCULAR; INTRAVENOUS
Status: DISCONTINUED | OUTPATIENT
Start: 2021-09-09 | End: 2021-09-14 | Stop reason: HOSPADM

## 2021-09-09 RX ORDER — GLYCOPYRROLATE 1 MG/5 ML
SYRINGE (ML) INTRAVENOUS PRN
Status: DISCONTINUED | OUTPATIENT
Start: 2021-09-09 | End: 2021-09-09 | Stop reason: SDUPTHER

## 2021-09-09 RX ORDER — MORPHINE SULFATE 2 MG/ML
2 INJECTION, SOLUTION INTRAMUSCULAR; INTRAVENOUS
Status: DISCONTINUED | OUTPATIENT
Start: 2021-09-09 | End: 2021-09-14 | Stop reason: HOSPADM

## 2021-09-09 RX ORDER — SODIUM CHLORIDE 0.9 % (FLUSH) 0.9 %
5-40 SYRINGE (ML) INJECTION PRN
Status: DISCONTINUED | OUTPATIENT
Start: 2021-09-09 | End: 2021-09-14 | Stop reason: HOSPADM

## 2021-09-09 RX ORDER — KETAMINE HYDROCHLORIDE 10 MG/ML
INJECTION, SOLUTION INTRAMUSCULAR; INTRAVENOUS PRN
Status: DISCONTINUED | OUTPATIENT
Start: 2021-09-09 | End: 2021-09-09 | Stop reason: SDUPTHER

## 2021-09-09 RX ORDER — LIDOCAINE HYDROCHLORIDE 10 MG/ML
INJECTION, SOLUTION EPIDURAL; INFILTRATION; INTRACAUDAL; PERINEURAL PRN
Status: DISCONTINUED | OUTPATIENT
Start: 2021-09-09 | End: 2021-09-09 | Stop reason: SDUPTHER

## 2021-09-09 RX ORDER — HYDROCODONE BITARTRATE AND ACETAMINOPHEN 5; 325 MG/1; MG/1
1 TABLET ORAL
Status: DISCONTINUED | OUTPATIENT
Start: 2021-09-09 | End: 2021-09-09 | Stop reason: HOSPADM

## 2021-09-09 RX ORDER — IBUPROFEN 400 MG/1
400 TABLET ORAL EVERY 4 HOURS
Status: DISCONTINUED | OUTPATIENT
Start: 2021-09-09 | End: 2021-09-14 | Stop reason: HOSPADM

## 2021-09-09 RX ORDER — PANTOPRAZOLE SODIUM 40 MG/1
40 TABLET, DELAYED RELEASE ORAL
Status: DISCONTINUED | OUTPATIENT
Start: 2021-09-10 | End: 2021-09-14 | Stop reason: HOSPADM

## 2021-09-09 RX ORDER — SODIUM CHLORIDE 0.9 % (FLUSH) 0.9 %
5-40 SYRINGE (ML) INJECTION EVERY 12 HOURS SCHEDULED
Status: DISCONTINUED | OUTPATIENT
Start: 2021-09-09 | End: 2021-09-14 | Stop reason: HOSPADM

## 2021-09-09 RX ORDER — LISINOPRIL 10 MG/1
10 TABLET ORAL DAILY
Status: ON HOLD | COMMUNITY
End: 2021-09-14 | Stop reason: HOSPADM

## 2021-09-09 RX ORDER — FENTANYL CITRATE 50 UG/ML
INJECTION, SOLUTION INTRAMUSCULAR; INTRAVENOUS PRN
Status: DISCONTINUED | OUTPATIENT
Start: 2021-09-09 | End: 2021-09-09 | Stop reason: SDUPTHER

## 2021-09-09 RX ORDER — OXYCODONE HYDROCHLORIDE 5 MG/1
5 TABLET ORAL EVERY 4 HOURS PRN
Status: DISCONTINUED | OUTPATIENT
Start: 2021-09-09 | End: 2021-09-14 | Stop reason: HOSPADM

## 2021-09-09 RX ORDER — MEPERIDINE HYDROCHLORIDE 50 MG/ML
12.5 INJECTION INTRAMUSCULAR; INTRAVENOUS; SUBCUTANEOUS EVERY 5 MIN PRN
Status: DISCONTINUED | OUTPATIENT
Start: 2021-09-09 | End: 2021-09-09 | Stop reason: HOSPADM

## 2021-09-09 RX ORDER — KETOROLAC TROMETHAMINE 30 MG/ML
INJECTION, SOLUTION INTRAMUSCULAR; INTRAVENOUS PRN
Status: DISCONTINUED | OUTPATIENT
Start: 2021-09-09 | End: 2021-09-09 | Stop reason: SDUPTHER

## 2021-09-09 RX ORDER — LISINOPRIL 10 MG/1
10 TABLET ORAL DAILY
Status: DISCONTINUED | OUTPATIENT
Start: 2021-09-09 | End: 2021-09-14 | Stop reason: HOSPADM

## 2021-09-09 RX ORDER — DIPHENHYDRAMINE HYDROCHLORIDE 50 MG/ML
12.5 INJECTION INTRAMUSCULAR; INTRAVENOUS
Status: DISCONTINUED | OUTPATIENT
Start: 2021-09-09 | End: 2021-09-09 | Stop reason: HOSPADM

## 2021-09-09 RX ORDER — ONDANSETRON 2 MG/ML
4 INJECTION INTRAMUSCULAR; INTRAVENOUS EVERY 6 HOURS PRN
Status: DISCONTINUED | OUTPATIENT
Start: 2021-09-09 | End: 2021-09-14 | Stop reason: HOSPADM

## 2021-09-09 RX ORDER — AMLODIPINE BESYLATE 5 MG/1
5 TABLET ORAL DAILY
Status: DISCONTINUED | OUTPATIENT
Start: 2021-09-10 | End: 2021-09-14 | Stop reason: HOSPADM

## 2021-09-09 RX ORDER — MAGNESIUM HYDROXIDE 1200 MG/15ML
LIQUID ORAL CONTINUOUS PRN
Status: COMPLETED | OUTPATIENT
Start: 2021-09-09 | End: 2021-09-09

## 2021-09-09 RX ORDER — OXYCODONE HYDROCHLORIDE 5 MG/1
10 TABLET ORAL EVERY 4 HOURS PRN
Status: DISCONTINUED | OUTPATIENT
Start: 2021-09-09 | End: 2021-09-14 | Stop reason: HOSPADM

## 2021-09-09 RX ORDER — ONDANSETRON 2 MG/ML
INJECTION INTRAMUSCULAR; INTRAVENOUS PRN
Status: DISCONTINUED | OUTPATIENT
Start: 2021-09-09 | End: 2021-09-09 | Stop reason: SDUPTHER

## 2021-09-09 RX ORDER — PROMETHAZINE HYDROCHLORIDE 25 MG/ML
6.25 INJECTION, SOLUTION INTRAMUSCULAR; INTRAVENOUS
Status: DISCONTINUED | OUTPATIENT
Start: 2021-09-09 | End: 2021-09-09 | Stop reason: HOSPADM

## 2021-09-09 RX ORDER — ACETAMINOPHEN 325 MG/1
650 TABLET ORAL EVERY 6 HOURS PRN
Status: DISCONTINUED | OUTPATIENT
Start: 2021-09-09 | End: 2021-09-14 | Stop reason: HOSPADM

## 2021-09-09 RX ORDER — DEXAMETHASONE SODIUM PHOSPHATE 4 MG/ML
INJECTION, SOLUTION INTRA-ARTICULAR; INTRALESIONAL; INTRAMUSCULAR; INTRAVENOUS; SOFT TISSUE PRN
Status: DISCONTINUED | OUTPATIENT
Start: 2021-09-09 | End: 2021-09-09 | Stop reason: SDUPTHER

## 2021-09-09 RX ORDER — HYDRALAZINE HYDROCHLORIDE 20 MG/ML
5 INJECTION INTRAMUSCULAR; INTRAVENOUS EVERY 10 MIN PRN
Status: DISCONTINUED | OUTPATIENT
Start: 2021-09-09 | End: 2021-09-09 | Stop reason: HOSPADM

## 2021-09-09 RX ORDER — SODIUM CHLORIDE 9 MG/ML
25 INJECTION, SOLUTION INTRAVENOUS PRN
Status: DISCONTINUED | OUTPATIENT
Start: 2021-09-09 | End: 2021-09-14 | Stop reason: HOSPADM

## 2021-09-09 RX ORDER — ACETAMINOPHEN 650 MG/1
650 SUPPOSITORY RECTAL EVERY 6 HOURS PRN
Status: DISCONTINUED | OUTPATIENT
Start: 2021-09-09 | End: 2021-09-14 | Stop reason: HOSPADM

## 2021-09-09 RX ORDER — METOCLOPRAMIDE HYDROCHLORIDE 5 MG/ML
10 INJECTION INTRAMUSCULAR; INTRAVENOUS
Status: DISCONTINUED | OUTPATIENT
Start: 2021-09-09 | End: 2021-09-09 | Stop reason: HOSPADM

## 2021-09-09 RX ORDER — MIDAZOLAM HYDROCHLORIDE 1 MG/ML
INJECTION INTRAMUSCULAR; INTRAVENOUS PRN
Status: DISCONTINUED | OUTPATIENT
Start: 2021-09-09 | End: 2021-09-09 | Stop reason: SDUPTHER

## 2021-09-09 RX ORDER — FLUTICASONE PROPIONATE 50 MCG
2 SPRAY, SUSPENSION (ML) NASAL DAILY
Status: DISCONTINUED | OUTPATIENT
Start: 2021-09-09 | End: 2021-09-14 | Stop reason: HOSPADM

## 2021-09-09 RX ORDER — SODIUM CHLORIDE, SODIUM LACTATE, POTASSIUM CHLORIDE, CALCIUM CHLORIDE 600; 310; 30; 20 MG/100ML; MG/100ML; MG/100ML; MG/100ML
1000 INJECTION, SOLUTION INTRAVENOUS CONTINUOUS
Status: DISCONTINUED | OUTPATIENT
Start: 2021-09-09 | End: 2021-09-09

## 2021-09-09 RX ORDER — ONDANSETRON 4 MG/1
4 TABLET, ORALLY DISINTEGRATING ORAL EVERY 8 HOURS PRN
Status: DISCONTINUED | OUTPATIENT
Start: 2021-09-09 | End: 2021-09-14 | Stop reason: HOSPADM

## 2021-09-09 RX ORDER — GABAPENTIN 300 MG/1
300 CAPSULE ORAL 3 TIMES DAILY
Status: DISCONTINUED | OUTPATIENT
Start: 2021-09-09 | End: 2021-09-14 | Stop reason: HOSPADM

## 2021-09-09 RX ORDER — PROPOFOL 10 MG/ML
INJECTION, EMULSION INTRAVENOUS PRN
Status: DISCONTINUED | OUTPATIENT
Start: 2021-09-09 | End: 2021-09-09 | Stop reason: SDUPTHER

## 2021-09-09 RX ORDER — POLYETHYLENE GLYCOL 3350 17 G/17G
17 POWDER, FOR SOLUTION ORAL DAILY PRN
Status: DISCONTINUED | OUTPATIENT
Start: 2021-09-09 | End: 2021-09-14 | Stop reason: HOSPADM

## 2021-09-09 RX ADMIN — KETAMINE HYDROCHLORIDE 10 MG: 10 INJECTION INTRAMUSCULAR; INTRAVENOUS at 08:44

## 2021-09-09 RX ADMIN — GABAPENTIN 300 MG: 300 CAPSULE ORAL at 14:24

## 2021-09-09 RX ADMIN — KETAMINE HYDROCHLORIDE 10 MG: 10 INJECTION INTRAMUSCULAR; INTRAVENOUS at 09:12

## 2021-09-09 RX ADMIN — FENTANYL CITRATE 50 MCG: 50 INJECTION, SOLUTION INTRAMUSCULAR; INTRAVENOUS at 09:07

## 2021-09-09 RX ADMIN — IBUPROFEN 400 MG: 400 TABLET, FILM COATED ORAL at 12:01

## 2021-09-09 RX ADMIN — DEXAMETHASONE SODIUM PHOSPHATE 4 MG: 4 INJECTION, SOLUTION INTRAMUSCULAR; INTRAVENOUS at 07:56

## 2021-09-09 RX ADMIN — Medication 0.2 MG: at 07:49

## 2021-09-09 RX ADMIN — IBUPROFEN 400 MG: 400 TABLET, FILM COATED ORAL at 21:37

## 2021-09-09 RX ADMIN — HYDROMORPHONE HYDROCHLORIDE 0.5 MG: 1 INJECTION, SOLUTION INTRAMUSCULAR; INTRAVENOUS; SUBCUTANEOUS at 09:50

## 2021-09-09 RX ADMIN — HYDROMORPHONE HYDROCHLORIDE 0.25 MG: 1 INJECTION, SOLUTION INTRAMUSCULAR; INTRAVENOUS; SUBCUTANEOUS at 10:36

## 2021-09-09 RX ADMIN — MIDAZOLAM HYDROCHLORIDE 2 MG: 1 INJECTION, SOLUTION INTRAMUSCULAR; INTRAVENOUS at 07:35

## 2021-09-09 RX ADMIN — FLUTICASONE PROPIONATE 2 SPRAY: 50 SPRAY, METERED NASAL at 12:02

## 2021-09-09 RX ADMIN — HYDROMORPHONE HYDROCHLORIDE 0.5 MG: 1 INJECTION, SOLUTION INTRAMUSCULAR; INTRAVENOUS; SUBCUTANEOUS at 09:25

## 2021-09-09 RX ADMIN — SODIUM CHLORIDE, POTASSIUM CHLORIDE, SODIUM LACTATE AND CALCIUM CHLORIDE: 600; 310; 30; 20 INJECTION, SOLUTION INTRAVENOUS at 07:33

## 2021-09-09 RX ADMIN — ONDANSETRON 4 MG: 2 INJECTION, SOLUTION INTRAMUSCULAR; INTRAVENOUS at 08:46

## 2021-09-09 RX ADMIN — HYDROMORPHONE HYDROCHLORIDE 0.5 MG: 1 INJECTION, SOLUTION INTRAMUSCULAR; INTRAVENOUS; SUBCUTANEOUS at 09:35

## 2021-09-09 RX ADMIN — IBUPROFEN 400 MG: 400 TABLET, FILM COATED ORAL at 16:30

## 2021-09-09 RX ADMIN — HYDROMORPHONE HYDROCHLORIDE 0.5 MG: 1 INJECTION, SOLUTION INTRAMUSCULAR; INTRAVENOUS; SUBCUTANEOUS at 09:56

## 2021-09-09 RX ADMIN — GABAPENTIN 300 MG: 300 CAPSULE ORAL at 21:37

## 2021-09-09 RX ADMIN — HYDROMORPHONE HYDROCHLORIDE 0.5 MG: 1 INJECTION, SOLUTION INTRAMUSCULAR; INTRAVENOUS; SUBCUTANEOUS at 09:20

## 2021-09-09 RX ADMIN — HYDROMORPHONE HYDROCHLORIDE 0.25 MG: 1 INJECTION, SOLUTION INTRAMUSCULAR; INTRAVENOUS; SUBCUTANEOUS at 10:15

## 2021-09-09 RX ADMIN — KETOROLAC TROMETHAMINE 15 MG: 30 INJECTION, SOLUTION INTRAMUSCULAR at 08:48

## 2021-09-09 RX ADMIN — HYDROMORPHONE HYDROCHLORIDE 0.25 MG: 1 INJECTION, SOLUTION INTRAMUSCULAR; INTRAVENOUS; SUBCUTANEOUS at 10:10

## 2021-09-09 RX ADMIN — LIDOCAINE HYDROCHLORIDE 50 MG: 10 INJECTION, SOLUTION EPIDURAL; INFILTRATION; INTRACAUDAL; PERINEURAL at 07:38

## 2021-09-09 RX ADMIN — IBUPROFEN 400 MG: 400 TABLET, FILM COATED ORAL at 23:44

## 2021-09-09 RX ADMIN — FENTANYL CITRATE 25 MCG: 50 INJECTION, SOLUTION INTRAMUSCULAR; INTRAVENOUS at 08:10

## 2021-09-09 RX ADMIN — FENTANYL CITRATE 25 MCG: 50 INJECTION, SOLUTION INTRAMUSCULAR; INTRAVENOUS at 08:38

## 2021-09-09 RX ADMIN — HYDROMORPHONE HYDROCHLORIDE 0.5 MG: 1 INJECTION, SOLUTION INTRAMUSCULAR; INTRAVENOUS; SUBCUTANEOUS at 09:30

## 2021-09-09 RX ADMIN — OXYCODONE HYDROCHLORIDE 10 MG: 5 TABLET ORAL at 18:19

## 2021-09-09 RX ADMIN — KETAMINE HYDROCHLORIDE 50 MG: 10 INJECTION INTRAMUSCULAR; INTRAVENOUS at 07:48

## 2021-09-09 RX ADMIN — PROPOFOL 200 MG: 10 INJECTION, EMULSION INTRAVENOUS at 07:38

## 2021-09-09 RX ADMIN — FENTANYL CITRATE 25 MCG: 50 INJECTION, SOLUTION INTRAMUSCULAR; INTRAVENOUS at 09:00

## 2021-09-09 RX ADMIN — LISINOPRIL 10 MG: 10 TABLET ORAL at 12:01

## 2021-09-09 RX ADMIN — FENTANYL CITRATE 25 MCG: 50 INJECTION, SOLUTION INTRAMUSCULAR; INTRAVENOUS at 08:08

## 2021-09-09 RX ADMIN — FENTANYL CITRATE 50 MCG: 50 INJECTION, SOLUTION INTRAMUSCULAR; INTRAVENOUS at 07:38

## 2021-09-09 RX ADMIN — SODIUM CHLORIDE, PRESERVATIVE FREE 10 ML: 5 INJECTION INTRAVENOUS at 21:37

## 2021-09-09 ASSESSMENT — PAIN DESCRIPTION - PAIN TYPE
TYPE: SURGICAL PAIN
TYPE: ACUTE PAIN;SURGICAL PAIN
TYPE: SURGICAL PAIN

## 2021-09-09 ASSESSMENT — PAIN DESCRIPTION - PROGRESSION
CLINICAL_PROGRESSION: GRADUALLY IMPROVING
CLINICAL_PROGRESSION: GRADUALLY IMPROVING
CLINICAL_PROGRESSION: NOT CHANGED
CLINICAL_PROGRESSION: GRADUALLY IMPROVING

## 2021-09-09 ASSESSMENT — PAIN SCALES - GENERAL
PAINLEVEL_OUTOF10: 10
PAINLEVEL_OUTOF10: 3
PAINLEVEL_OUTOF10: 3
PAINLEVEL_OUTOF10: 10
PAINLEVEL_OUTOF10: 7
PAINLEVEL_OUTOF10: 5
PAINLEVEL_OUTOF10: 3
PAINLEVEL_OUTOF10: 10
PAINLEVEL_OUTOF10: 5
PAINLEVEL_OUTOF10: 2
PAINLEVEL_OUTOF10: 9
PAINLEVEL_OUTOF10: 10
PAINLEVEL_OUTOF10: 6
PAINLEVEL_OUTOF10: 5
PAINLEVEL_OUTOF10: 10
PAINLEVEL_OUTOF10: 2
PAINLEVEL_OUTOF10: 6

## 2021-09-09 ASSESSMENT — PULMONARY FUNCTION TESTS
PIF_VALUE: 7
PIF_VALUE: 3
PIF_VALUE: 0
PIF_VALUE: 3
PIF_VALUE: 2
PIF_VALUE: 4
PIF_VALUE: 5
PIF_VALUE: 5
PIF_VALUE: 0
PIF_VALUE: 6
PIF_VALUE: 1
PIF_VALUE: 4
PIF_VALUE: 4
PIF_VALUE: 6
PIF_VALUE: 4
PIF_VALUE: 7
PIF_VALUE: 1
PIF_VALUE: 3
PIF_VALUE: 4
PIF_VALUE: 6
PIF_VALUE: 4
PIF_VALUE: 3
PIF_VALUE: 3
PIF_VALUE: 6
PIF_VALUE: 4
PIF_VALUE: 1
PIF_VALUE: 6
PIF_VALUE: 6
PIF_VALUE: 4
PIF_VALUE: 3
PIF_VALUE: 4
PIF_VALUE: 7
PIF_VALUE: 4
PIF_VALUE: 2
PIF_VALUE: 1
PIF_VALUE: 4
PIF_VALUE: 1
PIF_VALUE: 3
PIF_VALUE: 4
PIF_VALUE: 6
PIF_VALUE: 4
PIF_VALUE: 3
PIF_VALUE: 5
PIF_VALUE: 4
PIF_VALUE: 6
PIF_VALUE: 3
PIF_VALUE: 5
PIF_VALUE: 3
PIF_VALUE: 5
PIF_VALUE: 4
PIF_VALUE: 6
PIF_VALUE: 5
PIF_VALUE: 4
PIF_VALUE: 3
PIF_VALUE: 20
PIF_VALUE: 7
PIF_VALUE: 3
PIF_VALUE: 3
PIF_VALUE: 4
PIF_VALUE: 9
PIF_VALUE: 5
PIF_VALUE: 4
PIF_VALUE: 4
PIF_VALUE: 1
PIF_VALUE: 6
PIF_VALUE: 7
PIF_VALUE: 0
PIF_VALUE: 3
PIF_VALUE: 3
PIF_VALUE: 9
PIF_VALUE: 3
PIF_VALUE: 4
PIF_VALUE: 3
PIF_VALUE: 4
PIF_VALUE: 4
PIF_VALUE: 7
PIF_VALUE: 1
PIF_VALUE: 4
PIF_VALUE: 9
PIF_VALUE: 3
PIF_VALUE: 1
PIF_VALUE: 3
PIF_VALUE: 9
PIF_VALUE: 13
PIF_VALUE: 4

## 2021-09-09 ASSESSMENT — PAIN DESCRIPTION - ORIENTATION
ORIENTATION: LEFT;UPPER
ORIENTATION: LEFT
ORIENTATION: LEFT
ORIENTATION: LEFT;UPPER
ORIENTATION: LEFT
ORIENTATION: LEFT
ORIENTATION: LEFT;UPPER
ORIENTATION: LEFT
ORIENTATION: LEFT;UPPER
ORIENTATION: LEFT
ORIENTATION: LEFT

## 2021-09-09 ASSESSMENT — PAIN DESCRIPTION - LOCATION
LOCATION: LEG

## 2021-09-09 ASSESSMENT — PAIN DESCRIPTION - FREQUENCY
FREQUENCY: CONTINUOUS

## 2021-09-09 ASSESSMENT — PAIN - FUNCTIONAL ASSESSMENT
PAIN_FUNCTIONAL_ASSESSMENT: PREVENTS OR INTERFERES SOME ACTIVE ACTIVITIES AND ADLS
PAIN_FUNCTIONAL_ASSESSMENT: 0-10

## 2021-09-09 ASSESSMENT — PAIN DESCRIPTION - DESCRIPTORS
DESCRIPTORS: BURNING
DESCRIPTORS: BURNING
DESCRIPTORS: BURNING;ACHING
DESCRIPTORS: BURNING;ACHING

## 2021-09-09 ASSESSMENT — PAIN DESCRIPTION - ONSET: ONSET: ON-GOING
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
## 2021-09-09 NOTE — ANESTHESIA POSTPROCEDURE EVALUATION
POST- ANESTHESIA EVALUATION       Pt Name: Sharmila Shore  MRN: [id number]  YOB: 1955  Date of evaluation: 9/9/2021  Time:  11:28 AM      BP (!) 141/85   Pulse 91   Temp 97.5 °F (36.4 °C) (Temporal)   Resp 18   Ht 6' (1.829 m)   Wt 225 lb (102.1 kg)   SpO2 94%   BMI 30.52 kg/m²      Consciousness Level  Awake  Cardiopulmonary Status  Stable  Pain Adequately Treated YES  Nausea / Vomiting  NO  Adequate Hydration  YES  Anesthesia Related Complications NONE      Electronically signed by Charles Macias MD on 9/9/2021 at 11:28 AM       Department of Anesthesiology  Postprocedure Note    Patient: Sharmila Shore  MRN: [id number]  YOB: 1955  Date of evaluation: 9/9/2021  Time:  11:28 AM     Procedure Summary     Date: 09/09/21 Room / Location: [address]    Anesthesia Start: 8114 Anesthesia Stop: 3995    Procedure: FOOT DEBRIDMENT AND SPLIT THICKNESS SKIN GRAFT THIRD DEGREE BURN TO FOOT (Left ) Diagnosis: (BURN LEFT FOOT)    Surgeons: Tete Muñoz MD Responsible Provider: Charles Macias MD    Anesthesia Type: general ASA Status: 2          Anesthesia Type: general    Katie Phase I: Katie Score: 9    Katie Phase II:      Last vitals: Reviewed and per EMR flowsheets.        Anesthesia Post Evaluation

## 2021-09-09 NOTE — PROGRESS NOTES
Physical Therapy        Physical Therapy Cancel Note      DATE: 2021    NAME: Susan Chavez  MRN: [id number]   : 1955      Patient not seen this date for Physical Therapy due to:    Surgery/Procedure: FOOT DEBRIDMENT AND SPLIT THICKNESS SKIN GRAFT THIRD DEGREE       Electronically signed by Tania Johnson PT on 2021 at 11:40 AM
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
## 2021-09-09 NOTE — CARE COORDINATION
Case Management Initial Discharge Plan  Duane C Cordray,             Met with:patient to discuss discharge plans. Information verified: address, contacts, phone number, , insurance Yes  Insurance Provider: medicare/[employer] [place]    Emergency Contact/Next of Kin name & number: wife coleen  Who are involved in patient's support system? wife    PCP: Lex Anthony  Date of last visit: last wk      Discharge Planning    Living Arrangements:    lives with wife    Home has 1 stories      Patient able to perform ADL's:Independent    Current Services (outpatient & in home) none  DME equipment: dressing supplies [facility]      Is patient receiving oral anticoagulation therapy? No    Potential Assistance Needed:   f/u plastics, dressing supplies         Evaluation: no      Patient expects to be discharged to:   home    If home: is the family and/or caregiver wiling & able to provide support at home? yes  Who will be providing this support? wife    Follow Up Appointment: Best Day/ Time:      Transportation provider: wife  Transportation arrangements needed for discharge: No    Readmission Risk              Risk of Unplanned Readmission:  5             Does patient have a readmission risk score greater than 14?: No  If yes, follow-up appointment must be made within 7 days of discharge.      Goals of Care: safe transition plan      Educated yes on transitional options, provided freedom of choice and are agreeable with plan      Discharge Plan: return home with wife           Electronically signed by Axel Howell RN on 21 at 12:43 PM EDT

## 2021-09-09 NOTE — INTERVAL H&P NOTE
Pt Name: Sharmila Shore  MRN: [id number]  YOB: 1955  Date of evaluation: 9/9/2021    I have reviewed the patient's history and physical examination completed in pre-admission testing on 8/31/21. No changes to history or on examination today, unless noted below. Cardiac clearance obtained and in physical chart.      DANNY Jean - CNP  9/9/21  6:30 AM

## 2021-09-10 PROCEDURE — 2580000003 HC RX 258: Performed by: PODIATRIST

## 2021-09-10 PROCEDURE — 1200000000 HC SEMI PRIVATE

## 2021-09-10 PROCEDURE — 6370000000 HC RX 637 (ALT 250 FOR IP): Performed by: PODIATRIST

## 2021-09-10 RX ADMIN — POLYETHYLENE GLYCOL 3350 17 G: 17 POWDER, FOR SOLUTION ORAL at 11:32

## 2021-09-10 RX ADMIN — LISINOPRIL 10 MG: 10 TABLET ORAL at 08:39

## 2021-09-10 RX ADMIN — IBUPROFEN 400 MG: 400 TABLET, FILM COATED ORAL at 03:48

## 2021-09-10 RX ADMIN — AMLODIPINE BESYLATE 5 MG: 5 TABLET ORAL at 08:39

## 2021-09-10 RX ADMIN — IBUPROFEN 400 MG: 400 TABLET, FILM COATED ORAL at 08:39

## 2021-09-10 RX ADMIN — SODIUM CHLORIDE, PRESERVATIVE FREE 10 ML: 5 INJECTION INTRAVENOUS at 08:42

## 2021-09-10 RX ADMIN — GABAPENTIN 300 MG: 300 CAPSULE ORAL at 08:39

## 2021-09-10 RX ADMIN — OXYCODONE HYDROCHLORIDE 5 MG: 5 TABLET ORAL at 05:36

## 2021-09-10 RX ADMIN — IBUPROFEN 400 MG: 400 TABLET, FILM COATED ORAL at 11:32

## 2021-09-10 RX ADMIN — GABAPENTIN 300 MG: 300 CAPSULE ORAL at 15:00

## 2021-09-10 RX ADMIN — OXYCODONE HYDROCHLORIDE 5 MG: 5 TABLET ORAL at 16:15

## 2021-09-10 RX ADMIN — OXYCODONE HYDROCHLORIDE 5 MG: 5 TABLET ORAL at 11:35

## 2021-09-10 RX ADMIN — IBUPROFEN 400 MG: 400 TABLET, FILM COATED ORAL at 20:48

## 2021-09-10 RX ADMIN — PANTOPRAZOLE SODIUM 40 MG: 40 TABLET, DELAYED RELEASE ORAL at 05:35

## 2021-09-10 RX ADMIN — GABAPENTIN 300 MG: 300 CAPSULE ORAL at 20:48

## 2021-09-10 RX ADMIN — SODIUM CHLORIDE, PRESERVATIVE FREE 10 ML: 5 INJECTION INTRAVENOUS at 20:48

## 2021-09-10 RX ADMIN — IBUPROFEN 400 MG: 400 TABLET, FILM COATED ORAL at 16:16

## 2021-09-10 RX ADMIN — ACETAMINOPHEN 650 MG: 325 TABLET ORAL at 03:50

## 2021-09-10 RX ADMIN — FLUTICASONE PROPIONATE 2 SPRAY: 50 SPRAY, METERED NASAL at 08:41

## 2021-09-10 ASSESSMENT — PAIN SCALES - GENERAL
PAINLEVEL_OUTOF10: 5
PAINLEVEL_OUTOF10: 3
PAINLEVEL_OUTOF10: 2
PAINLEVEL_OUTOF10: 5
PAINLEVEL_OUTOF10: 4
PAINLEVEL_OUTOF10: 5
PAINLEVEL_OUTOF10: 5
PAINLEVEL_OUTOF10: 2
PAINLEVEL_OUTOF10: 6
PAINLEVEL_OUTOF10: 3

## 2021-09-10 ASSESSMENT — PAIN DESCRIPTION - DESCRIPTORS: DESCRIPTORS: BURNING;ACHING

## 2021-09-10 ASSESSMENT — PAIN DESCRIPTION - PROGRESSION
CLINICAL_PROGRESSION: NOT CHANGED

## 2021-09-10 ASSESSMENT — PAIN DESCRIPTION - PAIN TYPE: TYPE: ACUTE PAIN;SURGICAL PAIN

## 2021-09-10 ASSESSMENT — PAIN DESCRIPTION - ONSET: ONSET: ON-GOING

## 2021-09-10 ASSESSMENT — PAIN DESCRIPTION - ORIENTATION: ORIENTATION: LEFT

## 2021-09-10 ASSESSMENT — PAIN SCALES - WONG BAKER: WONGBAKER_NUMERICALRESPONSE: 0

## 2021-09-10 ASSESSMENT — PAIN - FUNCTIONAL ASSESSMENT: PAIN_FUNCTIONAL_ASSESSMENT: PREVENTS OR INTERFERES SOME ACTIVE ACTIVITIES AND ADLS

## 2021-09-10 ASSESSMENT — PAIN DESCRIPTION - LOCATION: LOCATION: LEG

## 2021-09-10 ASSESSMENT — PAIN DESCRIPTION - FREQUENCY: FREQUENCY: CONTINUOUS

## 2021-09-10 NOTE — PLAN OF CARE
Problem: Pain:  Goal: Pain level will decrease  Description: Pain level will decrease  9/10/2021 0211 by Raz Lorenzo RN  Outcome: Ongoing  9/9/2021 1713 by León Monreal RN  Outcome: Ongoing  Goal: Control of acute pain  Description: Control of acute pain  9/10/2021 0211 by Raz Lorenzo RN  Outcome: Ongoing  9/9/2021 1713 by León Monreal RN  Outcome: Ongoing  Goal: Control of chronic pain  Description: Control of chronic pain  9/10/2021 0211 by Raz Lorenzo RN  Outcome: Ongoing  9/9/2021 1713 by León Monreal RN  Outcome: Ongoing     Problem: Skin Integrity:  Goal: Will show no infection signs and symptoms  Description: Will show no infection signs and symptoms  9/10/2021 0211 by Raz Lorenzo RN  Outcome: Ongoing  9/9/2021 1713 by León Monreal RN  Outcome: Ongoing  Goal: Absence of new skin breakdown  Description: Absence of new skin breakdown  9/10/2021 0211 by Raz Lorenzo RN  Outcome: Ongoing  9/9/2021 1713 by León Monreal RN  Outcome: Ongoing

## 2021-09-10 NOTE — PROGRESS NOTES
[facility]  Occupational Therapy Not Seen Note    DATE: 9/10/2021    NAME: Mikey Sarmiento  MRN: [id number]   : 1955      Patient not seen this date for Occupational Therapy due to:    Strict Bedrest:    Next Scheduled Treatment: Check     Electronically signed by Malick Dow S/ELDER on 9/10/2021 at 8:08 AM
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
## 2021-09-11 PROCEDURE — 6370000000 HC RX 637 (ALT 250 FOR IP): Performed by: PODIATRIST

## 2021-09-11 PROCEDURE — 6370000000 HC RX 637 (ALT 250 FOR IP): Performed by: PLASTIC SURGERY

## 2021-09-11 PROCEDURE — 1200000000 HC SEMI PRIVATE

## 2021-09-11 PROCEDURE — 2580000003 HC RX 258: Performed by: PODIATRIST

## 2021-09-11 RX ADMIN — FLUTICASONE PROPIONATE 2 SPRAY: 50 SPRAY, METERED NASAL at 08:38

## 2021-09-11 RX ADMIN — GABAPENTIN 300 MG: 300 CAPSULE ORAL at 21:06

## 2021-09-11 RX ADMIN — BISACODYL 5 MG: 5 TABLET, COATED ORAL at 15:23

## 2021-09-11 RX ADMIN — IBUPROFEN 400 MG: 400 TABLET, FILM COATED ORAL at 18:16

## 2021-09-11 RX ADMIN — IBUPROFEN 400 MG: 400 TABLET, FILM COATED ORAL at 14:39

## 2021-09-11 RX ADMIN — PANTOPRAZOLE SODIUM 40 MG: 40 TABLET, DELAYED RELEASE ORAL at 06:12

## 2021-09-11 RX ADMIN — LISINOPRIL 10 MG: 10 TABLET ORAL at 08:30

## 2021-09-11 RX ADMIN — GABAPENTIN 300 MG: 300 CAPSULE ORAL at 14:40

## 2021-09-11 RX ADMIN — IBUPROFEN 400 MG: 400 TABLET, FILM COATED ORAL at 10:32

## 2021-09-11 RX ADMIN — OXYCODONE HYDROCHLORIDE 5 MG: 5 TABLET ORAL at 08:37

## 2021-09-11 RX ADMIN — POLYETHYLENE GLYCOL 3350 17 G: 17 POWDER, FOR SOLUTION ORAL at 08:30

## 2021-09-11 RX ADMIN — OXYCODONE HYDROCHLORIDE 5 MG: 5 TABLET ORAL at 12:50

## 2021-09-11 RX ADMIN — AMLODIPINE BESYLATE 5 MG: 5 TABLET ORAL at 08:30

## 2021-09-11 RX ADMIN — OXYCODONE HYDROCHLORIDE 5 MG: 5 TABLET ORAL at 02:23

## 2021-09-11 RX ADMIN — SODIUM CHLORIDE, PRESERVATIVE FREE 10 ML: 5 INJECTION INTRAVENOUS at 08:30

## 2021-09-11 RX ADMIN — IBUPROFEN 400 MG: 400 TABLET, FILM COATED ORAL at 06:12

## 2021-09-11 RX ADMIN — IBUPROFEN 400 MG: 400 TABLET, FILM COATED ORAL at 21:06

## 2021-09-11 RX ADMIN — OXYCODONE HYDROCHLORIDE 5 MG: 5 TABLET ORAL at 21:05

## 2021-09-11 RX ADMIN — IBUPROFEN 400 MG: 400 TABLET, FILM COATED ORAL at 02:23

## 2021-09-11 RX ADMIN — GABAPENTIN 300 MG: 300 CAPSULE ORAL at 08:30

## 2021-09-11 ASSESSMENT — PAIN DESCRIPTION - ONSET: ONSET: ON-GOING

## 2021-09-11 ASSESSMENT — PAIN DESCRIPTION - PAIN TYPE: TYPE: ACUTE PAIN;SURGICAL PAIN

## 2021-09-11 ASSESSMENT — PAIN DESCRIPTION - PROGRESSION
CLINICAL_PROGRESSION: NOT CHANGED

## 2021-09-11 ASSESSMENT — PAIN SCALES - GENERAL
PAINLEVEL_OUTOF10: 5
PAINLEVEL_OUTOF10: 4
PAINLEVEL_OUTOF10: 5
PAINLEVEL_OUTOF10: 5
PAINLEVEL_OUTOF10: 0
PAINLEVEL_OUTOF10: 6
PAINLEVEL_OUTOF10: 5
PAINLEVEL_OUTOF10: 4
PAINLEVEL_OUTOF10: 5
PAINLEVEL_OUTOF10: 5

## 2021-09-11 ASSESSMENT — PAIN DESCRIPTION - DESCRIPTORS: DESCRIPTORS: ACHING

## 2021-09-11 ASSESSMENT — PAIN - FUNCTIONAL ASSESSMENT: PAIN_FUNCTIONAL_ASSESSMENT: PREVENTS OR INTERFERES SOME ACTIVE ACTIVITIES AND ADLS

## 2021-09-11 ASSESSMENT — PAIN DESCRIPTION - LOCATION: LOCATION: LEG

## 2021-09-11 ASSESSMENT — PAIN DESCRIPTION - ORIENTATION: ORIENTATION: LEFT

## 2021-09-11 ASSESSMENT — PAIN DESCRIPTION - FREQUENCY: FREQUENCY: CONTINUOUS

## 2021-09-11 NOTE — PLAN OF CARE
Problem: Pain:  Goal: Pain level will decrease  Description: Pain level will decrease  9/11/2021 0303 by Anthony Villeda RN  Outcome: Ongoing  9/10/2021 1835 by Dimitri Santamaria RN  Outcome: Ongoing  Goal: Control of acute pain  Description: Control of acute pain  9/11/2021 0303 by Anthony Villeda RN  Outcome: Ongoing  9/10/2021 1835 by Dimitri Santamaria RN  Outcome: Ongoing  Goal: Control of chronic pain  Description: Control of chronic pain  9/11/2021 0303 by Anthony Villeda RN  Outcome: Ongoing  9/10/2021 1835 by Dimitri Santamaria RN  Outcome: Ongoing     Problem: Skin Integrity:  Goal: Will show no infection signs and symptoms  Description: Will show no infection signs and symptoms  9/11/2021 0303 by Anthony Villeda RN  Outcome: Ongoing  9/10/2021 1835 by Dimitri Santamaria RN  Outcome: Ongoing  Goal: Absence of new skin breakdown  Description: Absence of new skin breakdown  9/11/2021 0303 by Anthony Villeda RN  Outcome: Ongoing  9/10/2021 1835 by Dimitri Santamaria RN  Outcome: Ongoing

## 2021-09-11 NOTE — CARE COORDINATION
Transitional planning.  Strict Bed Rest until dressing take down on 9/14, Home w/ wife, wife completes dressing changes- supplies from [employer], Wife will transport home, pt has established PCP

## 2021-09-11 NOTE — PLAN OF CARE
Problem: Pain:  Goal: Pain level will decrease  Description: Pain level will decrease  9/11/2021 1614 by Xiao Harmon RN  Outcome: Ongoing  9/11/2021 0303 by Ashok Robert RN  Outcome: Ongoing  Goal: Control of acute pain  Description: Control of acute pain  9/11/2021 1614 by Xiao Harmon RN  Outcome: Ongoing  9/11/2021 0303 by Ashok Robert RN  Outcome: Ongoing  Goal: Control of chronic pain  Description: Control of chronic pain  9/11/2021 1614 by Xiao Harmon RN  Outcome: Ongoing  9/11/2021 0303 by Ashok Robert RN  Outcome: Ongoing     Problem: Skin Integrity:  Goal: Will show no infection signs and symptoms  Description: Will show no infection signs and symptoms  9/11/2021 1614 by Xiao Harmon RN  Outcome: Ongoing  9/11/2021 0303 by sAhok Robert RN  Outcome: Ongoing  Goal: Absence of new skin breakdown  Description: Absence of new skin breakdown  9/11/2021 1614 by Xiao Harmon RN  Outcome: Ongoing  9/11/2021 0303 by Ashok Robert RN  Outcome: Ongoing

## 2021-09-12 PROCEDURE — 6370000000 HC RX 637 (ALT 250 FOR IP): Performed by: PODIATRIST

## 2021-09-12 PROCEDURE — 2580000003 HC RX 258: Performed by: PODIATRIST

## 2021-09-12 PROCEDURE — 6370000000 HC RX 637 (ALT 250 FOR IP): Performed by: PLASTIC SURGERY

## 2021-09-12 PROCEDURE — 1200000000 HC SEMI PRIVATE

## 2021-09-12 RX ADMIN — SODIUM CHLORIDE, PRESERVATIVE FREE 10 ML: 5 INJECTION INTRAVENOUS at 08:10

## 2021-09-12 RX ADMIN — IBUPROFEN 400 MG: 400 TABLET, FILM COATED ORAL at 14:11

## 2021-09-12 RX ADMIN — BISACODYL 5 MG: 5 TABLET, COATED ORAL at 17:16

## 2021-09-12 RX ADMIN — GABAPENTIN 300 MG: 300 CAPSULE ORAL at 08:10

## 2021-09-12 RX ADMIN — GABAPENTIN 300 MG: 300 CAPSULE ORAL at 20:29

## 2021-09-12 RX ADMIN — LISINOPRIL 10 MG: 10 TABLET ORAL at 08:10

## 2021-09-12 RX ADMIN — IBUPROFEN 400 MG: 400 TABLET, FILM COATED ORAL at 17:16

## 2021-09-12 RX ADMIN — AMLODIPINE BESYLATE 5 MG: 5 TABLET ORAL at 08:10

## 2021-09-12 RX ADMIN — FLUTICASONE PROPIONATE 2 SPRAY: 50 SPRAY, METERED NASAL at 09:21

## 2021-09-12 RX ADMIN — GABAPENTIN 300 MG: 300 CAPSULE ORAL at 14:11

## 2021-09-12 RX ADMIN — OXYCODONE HYDROCHLORIDE 5 MG: 5 TABLET ORAL at 05:46

## 2021-09-12 RX ADMIN — ACETAMINOPHEN 650 MG: 325 TABLET ORAL at 08:09

## 2021-09-12 RX ADMIN — IBUPROFEN 400 MG: 400 TABLET, FILM COATED ORAL at 21:16

## 2021-09-12 RX ADMIN — IBUPROFEN 400 MG: 400 TABLET, FILM COATED ORAL at 10:17

## 2021-09-12 RX ADMIN — PANTOPRAZOLE SODIUM 40 MG: 40 TABLET, DELAYED RELEASE ORAL at 05:46

## 2021-09-12 RX ADMIN — POLYETHYLENE GLYCOL 3350 17 G: 17 POWDER, FOR SOLUTION ORAL at 08:10

## 2021-09-12 RX ADMIN — OXYCODONE HYDROCHLORIDE 5 MG: 5 TABLET ORAL at 20:29

## 2021-09-12 RX ADMIN — IBUPROFEN 400 MG: 400 TABLET, FILM COATED ORAL at 05:46

## 2021-09-12 RX ADMIN — IBUPROFEN 400 MG: 400 TABLET, FILM COATED ORAL at 01:42

## 2021-09-12 ASSESSMENT — PAIN SCALES - GENERAL
PAINLEVEL_OUTOF10: 2
PAINLEVEL_OUTOF10: 7
PAINLEVEL_OUTOF10: 4
PAINLEVEL_OUTOF10: 5
PAINLEVEL_OUTOF10: 5
PAINLEVEL_OUTOF10: 4
PAINLEVEL_OUTOF10: 6
PAINLEVEL_OUTOF10: 8
PAINLEVEL_OUTOF10: 7

## 2021-09-12 ASSESSMENT — PAIN - FUNCTIONAL ASSESSMENT: PAIN_FUNCTIONAL_ASSESSMENT: PREVENTS OR INTERFERES SOME ACTIVE ACTIVITIES AND ADLS

## 2021-09-12 ASSESSMENT — PAIN DESCRIPTION - ONSET: ONSET: ON-GOING

## 2021-09-12 ASSESSMENT — PAIN DESCRIPTION - PROGRESSION: CLINICAL_PROGRESSION: NOT CHANGED

## 2021-09-12 ASSESSMENT — PAIN DESCRIPTION - LOCATION: LOCATION: LEG

## 2021-09-12 ASSESSMENT — PAIN DESCRIPTION - FREQUENCY: FREQUENCY: CONTINUOUS

## 2021-09-12 ASSESSMENT — PAIN DESCRIPTION - DESCRIPTORS: DESCRIPTORS: DISCOMFORT

## 2021-09-12 ASSESSMENT — PAIN DESCRIPTION - PAIN TYPE: TYPE: ACUTE PAIN;SURGICAL PAIN

## 2021-09-12 ASSESSMENT — PAIN DESCRIPTION - ORIENTATION: ORIENTATION: LEFT

## 2021-09-13 PROCEDURE — 2580000003 HC RX 258: Performed by: PODIATRIST

## 2021-09-13 PROCEDURE — 1200000000 HC SEMI PRIVATE

## 2021-09-13 PROCEDURE — 6370000000 HC RX 637 (ALT 250 FOR IP): Performed by: PODIATRIST

## 2021-09-13 RX ADMIN — PANTOPRAZOLE SODIUM 40 MG: 40 TABLET, DELAYED RELEASE ORAL at 06:10

## 2021-09-13 RX ADMIN — OXYCODONE HYDROCHLORIDE 5 MG: 5 TABLET ORAL at 23:36

## 2021-09-13 RX ADMIN — IBUPROFEN 400 MG: 400 TABLET, FILM COATED ORAL at 00:39

## 2021-09-13 RX ADMIN — FLUTICASONE PROPIONATE 2 SPRAY: 50 SPRAY, METERED NASAL at 09:30

## 2021-09-13 RX ADMIN — SODIUM CHLORIDE, PRESERVATIVE FREE 10 ML: 5 INJECTION INTRAVENOUS at 09:59

## 2021-09-13 RX ADMIN — GABAPENTIN 300 MG: 300 CAPSULE ORAL at 21:12

## 2021-09-13 RX ADMIN — AMLODIPINE BESYLATE 5 MG: 5 TABLET ORAL at 08:31

## 2021-09-13 RX ADMIN — IBUPROFEN 400 MG: 400 TABLET, FILM COATED ORAL at 13:14

## 2021-09-13 RX ADMIN — SODIUM CHLORIDE, PRESERVATIVE FREE 10 ML: 5 INJECTION INTRAVENOUS at 21:12

## 2021-09-13 RX ADMIN — GABAPENTIN 300 MG: 300 CAPSULE ORAL at 08:31

## 2021-09-13 RX ADMIN — IBUPROFEN 400 MG: 400 TABLET, FILM COATED ORAL at 21:12

## 2021-09-13 RX ADMIN — ACETAMINOPHEN 650 MG: 325 TABLET ORAL at 13:14

## 2021-09-13 RX ADMIN — IBUPROFEN 400 MG: 400 TABLET, FILM COATED ORAL at 10:27

## 2021-09-13 RX ADMIN — GABAPENTIN 300 MG: 300 CAPSULE ORAL at 14:45

## 2021-09-13 RX ADMIN — OXYCODONE HYDROCHLORIDE 5 MG: 5 TABLET ORAL at 06:10

## 2021-09-13 RX ADMIN — IBUPROFEN 400 MG: 400 TABLET, FILM COATED ORAL at 06:10

## 2021-09-13 RX ADMIN — LISINOPRIL 10 MG: 10 TABLET ORAL at 08:31

## 2021-09-13 RX ADMIN — IBUPROFEN 400 MG: 400 TABLET, FILM COATED ORAL at 17:32

## 2021-09-13 ASSESSMENT — PAIN DESCRIPTION - LOCATION
LOCATION: LEG
LOCATION: LEG

## 2021-09-13 ASSESSMENT — PAIN DESCRIPTION - ONSET
ONSET: ON-GOING
ONSET: ON-GOING

## 2021-09-13 ASSESSMENT — PAIN DESCRIPTION - DESCRIPTORS
DESCRIPTORS: BURNING
DESCRIPTORS: BURNING

## 2021-09-13 ASSESSMENT — PAIN DESCRIPTION - FREQUENCY
FREQUENCY: CONTINUOUS
FREQUENCY: CONTINUOUS

## 2021-09-13 ASSESSMENT — PAIN DESCRIPTION - PAIN TYPE
TYPE: ACUTE PAIN;SURGICAL PAIN
TYPE: ACUTE PAIN;SURGICAL PAIN

## 2021-09-13 ASSESSMENT — PAIN DESCRIPTION - ORIENTATION
ORIENTATION: LEFT
ORIENTATION: LEFT

## 2021-09-13 ASSESSMENT — PAIN - FUNCTIONAL ASSESSMENT
PAIN_FUNCTIONAL_ASSESSMENT: PREVENTS OR INTERFERES SOME ACTIVE ACTIVITIES AND ADLS
PAIN_FUNCTIONAL_ASSESSMENT: PREVENTS OR INTERFERES SOME ACTIVE ACTIVITIES AND ADLS

## 2021-09-13 ASSESSMENT — PAIN SCALES - GENERAL
PAINLEVEL_OUTOF10: 3
PAINLEVEL_OUTOF10: 5
PAINLEVEL_OUTOF10: 6
PAINLEVEL_OUTOF10: 4
PAINLEVEL_OUTOF10: 4
PAINLEVEL_OUTOF10: 3
PAINLEVEL_OUTOF10: 4
PAINLEVEL_OUTOF10: 5
PAINLEVEL_OUTOF10: 6
PAINLEVEL_OUTOF10: 3

## 2021-09-13 ASSESSMENT — PAIN DESCRIPTION - PROGRESSION
CLINICAL_PROGRESSION: NOT CHANGED
CLINICAL_PROGRESSION: NOT CHANGED

## 2021-09-13 NOTE — PROGRESS NOTES
Physical Therapy        Physical Therapy Cancel Note      DATE: 2021    NAME: Louis Gomez  MRN: [id number]   : 1955      Patient not seen this date for Physical Therapy due to:    Strict Bedrest: until , PT will check back 9/15/21 for evaluation needs.        Electronically signed by Leonard Chavez PT on 2021 at 8:54 AM

## 2021-09-13 NOTE — PLAN OF CARE
Problem: Pain:  Goal: Pain level will decrease  Description: Pain level will decrease  9/13/2021 1855 by Jaimie Powers RN  Outcome: Ongoing  9/13/2021 0527 by Fadi Mckinnon RN  Outcome: Ongoing  Goal: Control of acute pain  Description: Control of acute pain  9/13/2021 1855 by Jaimie Powers RN  Outcome: Ongoing  9/13/2021 0527 by Fadi Mckinnon RN  Outcome: Ongoing  Goal: Control of chronic pain  Description: Control of chronic pain  9/13/2021 1855 by Jaimie Powers RN  Outcome: Ongoing  9/13/2021 0527 by Fadi Mckinnon RN  Outcome: Ongoing     Problem: Skin Integrity:  Goal: Will show no infection signs and symptoms  Description: Will show no infection signs and symptoms  9/13/2021 1855 by Jaimie Powers RN  Outcome: Ongoing  9/13/2021 0527 by Fadi Mckinnon RN  Outcome: Ongoing  Goal: Absence of new skin breakdown  Description: Absence of new skin breakdown  9/13/2021 1855 by Jaimie Powers RN  Outcome: Ongoing  9/13/2021 0527 by Fadi Mckinnon RN  Outcome: Ongoing
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
## 2021-09-13 NOTE — PLAN OF CARE
Problem: Pain:  Goal: Pain level will decrease  Description: Pain level will decrease  9/13/2021 0527 by Chuck Person RN  Outcome: Ongoing  9/12/2021 1732 by Job Arriola RN  Outcome: Ongoing  Goal: Control of acute pain  Description: Control of acute pain  9/13/2021 0527 by Chuck Person RN  Outcome: Ongoing  9/12/2021 1732 by Job Arriola RN  Outcome: Ongoing  Goal: Control of chronic pain  Description: Control of chronic pain  9/13/2021 0527 by Chuck Person RN  Outcome: Ongoing  9/12/2021 1732 by Job Arriola RN  Outcome: Ongoing     Problem: Skin Integrity:  Goal: Will show no infection signs and symptoms  Description: Will show no infection signs and symptoms  9/13/2021 0527 by Chuck Person RN  Outcome: Ongoing  9/12/2021 1732 by Job Arriola RN  Outcome: Ongoing  Goal: Absence of new skin breakdown  Description: Absence of new skin breakdown  9/13/2021 0527 by Chuck Person RN  Outcome: Ongoing  9/12/2021 1732 by Job Arriola RN  Outcome: Ongoing

## 2021-09-14 VITALS
WEIGHT: 225 LBS | OXYGEN SATURATION: 97 % | HEIGHT: 72 IN | HEART RATE: 63 BPM | BODY MASS INDEX: 30.48 KG/M2 | RESPIRATION RATE: 18 BRPM | DIASTOLIC BLOOD PRESSURE: 90 MMHG | SYSTOLIC BLOOD PRESSURE: 125 MMHG | TEMPERATURE: 97.9 F

## 2021-09-14 PROCEDURE — 2580000003 HC RX 258: Performed by: PODIATRIST

## 2021-09-14 PROCEDURE — 6360000002 HC RX W HCPCS: Performed by: PODIATRIST

## 2021-09-14 PROCEDURE — 6370000000 HC RX 637 (ALT 250 FOR IP): Performed by: PODIATRIST

## 2021-09-14 RX ORDER — GABAPENTIN 300 MG/1
300 CAPSULE ORAL 3 TIMES DAILY
Qty: 90 CAPSULE | Refills: 0 | Status: SHIPPED | OUTPATIENT
Start: 2021-09-14 | End: 2021-10-26 | Stop reason: ALTCHOICE

## 2021-09-14 RX ORDER — GABAPENTIN 600 MG/1
300 TABLET ORAL 3 TIMES DAILY
Status: CANCELLED | OUTPATIENT
Start: 2021-09-14

## 2021-09-14 RX ORDER — OXYCODONE HYDROCHLORIDE 5 MG/1
5 TABLET ORAL EVERY 6 HOURS PRN
Qty: 30 TABLET | Refills: 0 | Status: SHIPPED | OUTPATIENT
Start: 2021-09-14 | End: 2021-10-12

## 2021-09-14 RX ADMIN — PANTOPRAZOLE SODIUM 40 MG: 40 TABLET, DELAYED RELEASE ORAL at 06:03

## 2021-09-14 RX ADMIN — ONDANSETRON 4 MG: 2 INJECTION INTRAMUSCULAR; INTRAVENOUS at 08:48

## 2021-09-14 RX ADMIN — FLUTICASONE PROPIONATE 2 SPRAY: 50 SPRAY, METERED NASAL at 10:06

## 2021-09-14 RX ADMIN — IBUPROFEN 400 MG: 400 TABLET, FILM COATED ORAL at 13:33

## 2021-09-14 RX ADMIN — AMLODIPINE BESYLATE 5 MG: 5 TABLET ORAL at 08:16

## 2021-09-14 RX ADMIN — IBUPROFEN 400 MG: 400 TABLET, FILM COATED ORAL at 06:02

## 2021-09-14 RX ADMIN — OXYCODONE HYDROCHLORIDE 10 MG: 5 TABLET ORAL at 08:16

## 2021-09-14 RX ADMIN — OXYCODONE HYDROCHLORIDE 10 MG: 5 TABLET ORAL at 17:02

## 2021-09-14 RX ADMIN — GABAPENTIN 300 MG: 300 CAPSULE ORAL at 13:33

## 2021-09-14 RX ADMIN — LISINOPRIL 10 MG: 10 TABLET ORAL at 08:15

## 2021-09-14 RX ADMIN — IBUPROFEN 400 MG: 400 TABLET, FILM COATED ORAL at 10:06

## 2021-09-14 RX ADMIN — IBUPROFEN 400 MG: 400 TABLET, FILM COATED ORAL at 02:22

## 2021-09-14 RX ADMIN — SODIUM CHLORIDE, PRESERVATIVE FREE 10 ML: 5 INJECTION INTRAVENOUS at 08:15

## 2021-09-14 RX ADMIN — GABAPENTIN 300 MG: 300 CAPSULE ORAL at 08:15

## 2021-09-14 ASSESSMENT — PAIN SCALES - GENERAL
PAINLEVEL_OUTOF10: 7
PAINLEVEL_OUTOF10: 3
PAINLEVEL_OUTOF10: 3
PAINLEVEL_OUTOF10: 7
PAINLEVEL_OUTOF10: 3
PAINLEVEL_OUTOF10: 4
PAINLEVEL_OUTOF10: 6
PAINLEVEL_OUTOF10: 3
PAINLEVEL_OUTOF10: 0

## 2021-09-14 ASSESSMENT — PAIN DESCRIPTION - PAIN TYPE: TYPE: ACUTE PAIN;SURGICAL PAIN

## 2021-09-14 ASSESSMENT — PAIN - FUNCTIONAL ASSESSMENT: PAIN_FUNCTIONAL_ASSESSMENT: PREVENTS OR INTERFERES WITH ALL ACTIVE AND SOME PASSIVE ACTIVITIES

## 2021-09-14 ASSESSMENT — PAIN DESCRIPTION - PROGRESSION: CLINICAL_PROGRESSION: NOT CHANGED

## 2021-09-14 ASSESSMENT — PAIN DESCRIPTION - FREQUENCY: FREQUENCY: CONTINUOUS

## 2021-09-14 ASSESSMENT — PAIN DESCRIPTION - ORIENTATION: ORIENTATION: LEFT

## 2021-09-14 ASSESSMENT — PAIN DESCRIPTION - LOCATION: LOCATION: LEG

## 2021-09-14 ASSESSMENT — PAIN DESCRIPTION - ONSET: ONSET: ON-GOING

## 2021-09-14 ASSESSMENT — PAIN DESCRIPTION - DESCRIPTORS: DESCRIPTORS: BURNING;DISCOMFORT

## 2021-09-14 NOTE — PLAN OF CARE
Problem: Pain:  Goal: Pain level will decrease  Description: Pain level will decrease  9/14/2021 0249 by Greg Severin, RN  Outcome: Ongoing  9/13/2021 1855 by Dottie Martínez RN  Outcome: Ongoing  Goal: Control of acute pain  Description: Control of acute pain  9/14/2021 0249 by Greg Severin, RN  Outcome: Ongoing  9/13/2021 1855 by Dottie Martínez RN  Outcome: Ongoing  Goal: Control of chronic pain  Description: Control of chronic pain  9/14/2021 0249 by Greg Severin, RN  Outcome: Ongoing  9/13/2021 1855 by Dottie Martínez RN  Outcome: Ongoing     Problem: Skin Integrity:  Goal: Will show no infection signs and symptoms  Description: Will show no infection signs and symptoms  9/14/2021 0249 by Greg Severin, RN  Outcome: Ongoing  9/13/2021 1855 by Dottie Martínez RN  Outcome: Ongoing  Goal: Absence of new skin breakdown  Description: Absence of new skin breakdown  9/14/2021 0249 by Greg Severin, RN  Outcome: Ongoing  9/13/2021 1855 by Dottie Martínez RN  Outcome: Ongoing

## 2021-09-14 NOTE — PLAN OF CARE
Problem: Pain:  Goal: Pain level will decrease  Description: Pain level will decrease  9/14/2021 1643 by Jay Lorenzo RN  Outcome: Completed  9/14/2021 0249 by Ashok Robert RN  Outcome: Ongoing  Goal: Control of acute pain  Description: Control of acute pain  9/14/2021 1643 by Jay Lorenzo RN  Outcome: Completed  9/14/2021 0249 by Ahsok Robert RN  Outcome: Ongoing  Goal: Control of chronic pain  Description: Control of chronic pain  9/14/2021 1643 by Jay Lorenzo RN  Outcome: Completed  9/14/2021 0249 by Ashok Robert RN  Outcome: Ongoing     Problem: Skin Integrity:  Goal: Will show no infection signs and symptoms  Description: Will show no infection signs and symptoms  9/14/2021 1643 by Jay Lorenzo RN  Outcome: Completed  9/14/2021 0249 by Ashok Robert RN  Outcome: Ongoing  Goal: Absence of new skin breakdown  Description: Absence of new skin breakdown  9/14/2021 1643 by Jay Lorenzo RN  Outcome: Completed  9/14/2021 0249 by Ashok Robert RN  Outcome: Ongoing     Problem: Falls - Risk of:  Goal: Will remain free from falls  Description: Will remain free from falls  Outcome: Completed  Goal: Absence of physical injury  Description: Absence of physical injury  Outcome: Completed

## 2021-09-14 NOTE — PROGRESS NOTES
Date Procedure started:  9/14/2021     Time Procedure started: 0800     Location Completed:    x   Bedside     Tubbing Room  Medication Given:  Roxicodone 10 mg          Photos Taken       yes                                                      Please jatin dressing applied to OR debrided injuries/ skin to all areas that apply below:     S=Silvadene    B=Bacitracin    M= Mepilex    F= Furacin        BOGGS=Santyl                             SUL=Sulfamylon     D=Donor site/xeroform    E=Eucerin    O=Other (specify below)  DRESSING APPLICATION/ CHANGE DEBRIDEMENT      (Y/N) BODY LOCATION   HEAD, FACE AND NECK         SCALP      RT EAR     LT EAR     NECK     FACE        CHEST     ABDOMEN     BACK     BUTTOCK     GENITALIA     PERINEUM        RT UPPER ARM (Includes Shoulder)     LT UPPER ARM (Includes Shoulder)     RT LOWER ARM (Includes Elbow or Wrist)     LT LOWER ARM (Includes Elbow or Wrist)     RT HAND (Includes Fingers)     LT HAND (Includes Fingers)        RT UPPER LEG (Includes Hip)   Donor site  LT UPPER LEG (Includes Hip)     RT LOWER LEG (Includes Knee)     LT LOWER LEG (Includes Knee)     RT FOOT (Includes Ankles or Toes)   F N LT FOOT (Includes Ankles or Toes)     ADDITIONAL NOTES: Pts STSG dressing was removed as well as all staples in foot. Grafts were gently cleansed using hibiclens. Using sterile technique writer applied furacin impregnated dermanet to STSG. Pt tolerated procedure well. Pt was given home educated on how to perform dressing change once daily. Pt was educated on signs and symptoms of infection. Pt was educated to allow donor site to air dry and trim edges to prevent skin trauma. Pt demonstrated understanding. All questions were answered in full.      \

## 2021-09-14 NOTE — PROGRESS NOTES
Pt was given discharge instructions with wife at bedside. Pt was was educated how to perform dressing change daily and care for STSG site as well as donor site. Pt requested gabapentin script to be refilled and sent to pharmacy. Writer reached out to Dr. Ang Smith who stated he would complete this today. Pt was given bag of supplies until follow up appt in clinic on Tuesday. All of pts and wives questions were answered in full. Pt was wheeled to main entrance of lobby per PCT with all belongings. Pt was discharged safely.
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
## 2021-09-21 ENCOUNTER — OFFICE VISIT (OUTPATIENT)
Dept: BURN CARE | Age: 66
End: 2021-09-21
Payer: MEDICARE

## 2021-09-21 VITALS
WEIGHT: 225 LBS | BODY MASS INDEX: 30.48 KG/M2 | SYSTOLIC BLOOD PRESSURE: 143 MMHG | HEIGHT: 72 IN | HEART RATE: 85 BPM | DIASTOLIC BLOOD PRESSURE: 83 MMHG

## 2021-09-21 DIAGNOSIS — T30.0 BURN: Primary | ICD-10-CM

## 2021-09-21 PROCEDURE — 99212 OFFICE O/P EST SF 10 MIN: CPT | Performed by: PLASTIC SURGERY

## 2021-09-21 PROCEDURE — 99024 POSTOP FOLLOW-UP VISIT: CPT | Performed by: PLASTIC SURGERY
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
## 2021-09-28 ENCOUNTER — OFFICE VISIT (OUTPATIENT)
Dept: BURN CARE | Age: 66
End: 2021-09-28
Payer: MEDICARE

## 2021-09-28 VITALS
SYSTOLIC BLOOD PRESSURE: 151 MMHG | DIASTOLIC BLOOD PRESSURE: 79 MMHG | HEIGHT: 72 IN | BODY MASS INDEX: 30.75 KG/M2 | HEART RATE: 57 BPM | WEIGHT: 227 LBS

## 2021-09-28 DIAGNOSIS — T30.0 BURN: ICD-10-CM

## 2021-09-28 PROCEDURE — 99212 OFFICE O/P EST SF 10 MIN: CPT | Performed by: PLASTIC SURGERY

## 2021-09-28 PROCEDURE — 99024 POSTOP FOLLOW-UP VISIT: CPT | Performed by: PLASTIC SURGERY
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
## 2021-09-29 RX ADMIN — Medication: at 09:41

## 2021-10-26 ENCOUNTER — OFFICE VISIT (OUTPATIENT)
Dept: BURN CARE | Age: 66
End: 2021-10-26
Payer: MEDICARE

## 2021-10-26 VITALS
DIASTOLIC BLOOD PRESSURE: 79 MMHG | HEIGHT: 72 IN | HEART RATE: 64 BPM | BODY MASS INDEX: 30.75 KG/M2 | SYSTOLIC BLOOD PRESSURE: 167 MMHG | WEIGHT: 227 LBS

## 2021-10-26 DIAGNOSIS — L91.0 HYPERTROPHIC SCAR: ICD-10-CM

## 2021-10-26 DIAGNOSIS — T30.0 BURN: Primary | ICD-10-CM

## 2021-10-26 PROCEDURE — 1036F TOBACCO NON-USER: CPT | Performed by: PLASTIC SURGERY

## 2021-10-26 PROCEDURE — 4040F PNEUMOC VAC/ADMIN/RCVD: CPT | Performed by: PLASTIC SURGERY

## 2021-10-26 PROCEDURE — 3017F COLORECTAL CA SCREEN DOC REV: CPT | Performed by: PLASTIC SURGERY

## 2021-10-26 PROCEDURE — G8427 DOCREV CUR MEDS BY ELIG CLIN: HCPCS | Performed by: PLASTIC SURGERY

## 2021-10-26 PROCEDURE — G8484 FLU IMMUNIZE NO ADMIN: HCPCS | Performed by: PLASTIC SURGERY

## 2021-10-26 PROCEDURE — 99212 OFFICE O/P EST SF 10 MIN: CPT | Performed by: PLASTIC SURGERY

## 2021-10-26 PROCEDURE — 1123F ACP DISCUSS/DSCN MKR DOCD: CPT | Performed by: PLASTIC SURGERY

## 2021-10-26 PROCEDURE — G8417 CALC BMI ABV UP PARAM F/U: HCPCS | Performed by: PLASTIC SURGERY

## 2021-10-26 RX ORDER — LISINOPRIL 10 MG/1
TABLET ORAL
COMMUNITY
Start: 2021-10-08
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
## 2022-05-03 ENCOUNTER — OFFICE VISIT (OUTPATIENT)
Dept: BURN CARE | Age: 67
End: 2022-05-03
Payer: MEDICARE

## 2022-05-03 VITALS
DIASTOLIC BLOOD PRESSURE: 92 MMHG | HEIGHT: 72 IN | BODY MASS INDEX: 33.18 KG/M2 | HEART RATE: 62 BPM | SYSTOLIC BLOOD PRESSURE: 157 MMHG | WEIGHT: 245 LBS

## 2022-05-03 DIAGNOSIS — T30.0 BURN: Primary | ICD-10-CM

## 2022-05-03 PROCEDURE — 4040F PNEUMOC VAC/ADMIN/RCVD: CPT | Performed by: NURSE PRACTITIONER

## 2022-05-03 PROCEDURE — G8427 DOCREV CUR MEDS BY ELIG CLIN: HCPCS | Performed by: NURSE PRACTITIONER

## 2022-05-03 PROCEDURE — G8417 CALC BMI ABV UP PARAM F/U: HCPCS | Performed by: NURSE PRACTITIONER

## 2022-05-03 PROCEDURE — 1036F TOBACCO NON-USER: CPT | Performed by: NURSE PRACTITIONER

## 2022-05-03 PROCEDURE — 1123F ACP DISCUSS/DSCN MKR DOCD: CPT | Performed by: NURSE PRACTITIONER

## 2022-05-03 PROCEDURE — 99212 OFFICE O/P EST SF 10 MIN: CPT | Performed by: NURSE PRACTITIONER

## 2022-05-03 PROCEDURE — 3017F COLORECTAL CA SCREEN DOC REV: CPT | Performed by: NURSE PRACTITIONER

## 2022-05-03 NOTE — PROGRESS NOTES
Burn Clinic Note    S: Pt is a 78 y.o. male being seen for scar management of left foot/ankle. Reports he initially wore his garments but has since stopped. Overall feels he is doing well. DOS 9/9/21    O: No hypertrophic scar formation to left foot and left ankle. Advised we do encourage garment for six months. Donor site soft and flat  Vitals:    05/03/22 0841   BP: (!) 157/92   Pulse: 62     Gen: NAD, cooperative      A/P: 78 y.o. male in clinic for continued scar management. Discussed garment and advised we will leave it up to him if he wants to wear but did advise it is usually worn for six months.   Scar flat at today visit    - Moisturizer daily  - Stay out of sun  - Wear garment for six months 24hr daily to treat and control hypertrophic scar formation  - F/u PRN    Hieu Barker, [address] [place] [address]

## 2022-12-20 NOTE — PLAN OF CARE
Problem: Pain:  Goal: Pain level will decrease  Description: Pain level will decrease  9/12/2021 1732 by Vonnie De La Cruz RN  Outcome: Ongoing  9/12/2021 0617 by Chava Cooper RN  Outcome: Ongoing  Goal: Control of acute pain  Description: Control of acute pain  9/12/2021 1732 by Vonnie De La Cruz RN  Outcome: Ongoing  9/12/2021 0617 by Chava Cooper RN  Outcome: Ongoing  Goal: Control of chronic pain  Description: Control of chronic pain  9/12/2021 1732 by Vonnie De La Cruz RN  Outcome: Ongoing  9/12/2021 0617 by Chava Cooper RN  Outcome: Ongoing     Problem: Skin Integrity:  Goal: Will show no infection signs and symptoms  Description: Will show no infection signs and symptoms  9/12/2021 1732 by Vonnie De La Cruz RN  Outcome: Ongoing  9/12/2021 0617 by Chava Cooper RN  Outcome: Ongoing  Goal: Absence of new skin breakdown  Description: Absence of new skin breakdown  9/12/2021 1732 by Vonnie De La Cruz RN  Outcome: Ongoing  9/12/2021 0617 by Chava Cooper RN  Outcome: Ongoing Pt re
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
## 2024-05-03 ENCOUNTER — HOSPITAL ENCOUNTER (OUTPATIENT)
Age: 69
Setting detail: THERAPIES SERIES
Discharge: HOME | End: 2024-05-03
Payer: MEDICARE

## 2024-05-03 PROCEDURE — 97140 MANUAL THERAPY 1/> REGIONS: CPT

## 2024-05-03 PROCEDURE — 97161 PT EVAL LOW COMPLEX 20 MIN: CPT

## 2024-05-03 NOTE — FLOWSHEET NOTE
[facility] Fall Risk Assessment    Patient Name:  Duane C Cordray  : 1955    Risk Factor Scale  Score   History of Falls [] Yes  [x] No 25  0 0   Secondary Diagnosis [] Yes  [x] No 15  0 0   Ambulatory Aid [] Furniture  [] Crutches/cane/walker  [x] None/bedrest/wheelchair/nurse 30  15  0 0   IV/Heparin Lock [] Yes  [x] No 20  0 0   Gait/Transferring [] Impaired  [] Weak  [x] Normal/bedrest/immobile 20  10  0 0   Mental Status [] Forgets limitations  [x] Oriented to own ability 15  0 0      Total:0     Based on the Assessment score: check the appropriate box.    [x]  No intervention needed   Low =   Score of 0-24    []  Use standard prevention interventions Moderate =  Score of 24-44   [] Give patient handout and discuss fall prevention strategies   [] Establish goal of education for patient/family RE: fall prevention strategies    []  Use high risk prevention interventions High = Score of 45 and higher   [] Give patient handout and discuss fall prevention strategies   [] Establish goal of education for patient/family Re: fall prevention strategies   [] Discuss lifeline / other resources    Electronically signed by:   Cristopher Cox PT  Date: 5/3/2024

## 2024-05-03 NOTE — CONSULTS
[x] [facility]  Outpatient Rehabilitation &  Therapy  [address] [facility] [address]  P: [phone]  F: [phone]     Physical Therapy Lower Extremity Evaluation    Date:  5/3/2024  Patient: Duane C Cordray  : 1955  MRN: [id number]  Physician: Keenan Davis DPM   Insurance: Medicare/[facility] - NO PRIOR AUTH REQUIRED  Medical Diagnosis: R plantar fasciitis  Rehab Codes: M72.2, M79.672  Onset date: 2023  Next Dr's appt.: TBD    Subjective:     CC:pt c/o intermittent R heel pain made worse w/ walking his dog 1 mile, using the accelerator in the car, climbing ladder, first few steps in the AM    HPI: (2023) gradual onset R heel pain that began worsening 2 months ago; he has had 2 sets of orthotics from his DPM and he feels the second set is helping; he is doing toe stretching exercises at home    PMHx: [] Unremarkable [] Diabetes [x] HTN  [] Pacemaker   [] MI/Heart Problems [] Cancer [] Arthritis [] Other:              [x] Refer to full medical chart  In EPIC       Comorbidities:   [] Obesity [] Dialysis  [x] N/A   [] Asthma/COPD [] Dementia [] Other:   [] Stroke [] Sleep apnea [] Other:   [] Vascular disease [] Rheumatic disease [] Other:     Tests: [] X-Ray: [] MRI:  [] Other:    Medications: [x] Refer to full medical record [] None [] Other:  Allergies:      [x] Refer to full medical record [] None [] Other:    Function:  Hand Dominance  [] Right  [] Left  Patient lives with: spouse   In what type of home [x]  One story   [] Two story   [] Split level   Number of stairs to enter 0   With handrail on the []  Right to enter   [] Left to enter   Bathroom has a []  Tub only  [] Tub/shower combo   [] Walk in shower    []  Grab bars   Washing machine is on []  Main level   [] Second level   [] Basement   Employer    Job Status []  Normal duty   [] Light duty   [] Off due to condition    [x]  Retired   [] Not employed   [] Disability  [] Other:  []  Return to work:   Work

## 2024-05-07 ENCOUNTER — HOSPITAL ENCOUNTER (OUTPATIENT)
Age: 69
Setting detail: THERAPIES SERIES
Discharge: HOME | End: 2024-05-07
Payer: MEDICARE

## 2024-05-07 PROCEDURE — 97035 APP MDLTY 1+ULTRASOUND EA 15: CPT

## 2024-05-07 PROCEDURE — 97110 THERAPEUTIC EXERCISES: CPT

## 2024-05-07 NOTE — FLOWSHEET NOTE
[x] [facility]   Outpatient Rehabilitation & Therapy  [address] [facility] [address]  P: [phone]  F: [phone]    Physical Therapy Daily Treatment Note      Date:  2024  Patient Name:  Duane C Cordray    :  1955  MRN: [id number]  Physician: Keenan Davis DPM                         Insurance: Medicare/[facility] - NO PRIOR AUTH REQUIRED  Medical Diagnosis: R plantar fasciitis                      Rehab Codes: M72.2, M79.672  Onset date: 2023                       Next Dr's appt.: TBD     Visit# / total visits:   Cancels/No Shows: 0/1    Subjective:  24   Pain:  [] Yes  [x] No Location: R heel Pain Rating: (0-10 scale) 0/10  Pain altered Tx:  [x] No  [] Yes  Action:    Comments: pt walked his dog 1/2 mile this AM and had no heel pain; he has been doing his home stretching program and feels this is helping    Objective:  Modalities:  20% US, 3 Mhz, 1.5w/cm2, plantar fascia attachment to R heel  Precautions: None  Exercises:  Exercise Reps/ Time Weight/ Level Comments   R gastroc and soleus stretches @ wall 30\" x 3 ea       Self plantar fascia stretch R 30\" x 3               Myofascial release 25 mins  Gastroc, soleus, calcaneus, and plantar fascia                                   Other: HEP update as below:     Access Code: [email or web address]  URL: [email or web address]  Date: 2024  Prepared by: Cristopher Cox    Exercises  - Seated Plantar Fascia Stretch  - 3 x daily - 7 x weekly - 1 sets - 3 reps - 30 second hold  - Gastroc Stretch on Wall  - 3 x daily - 7 x weekly - 1 sets - 3 reps - 30 second hold  - Soleus Stretch on Wall  - 3 x daily - 7 x weekly - 1 sets - 3 reps - 30 second hold      Specific Instructions for next treatment: advance ROM and soft tissue mobes as appropriate       Assessment: [x] Progressing toward goals. 24  pt noting less heel pain even w/ walking a half mile this AM    [] No change.     [] Other:    [x] Patient would continue to

## 2024-05-10 ENCOUNTER — HOSPITAL ENCOUNTER (OUTPATIENT)
Age: 69
Setting detail: THERAPIES SERIES
Discharge: HOME | End: 2024-05-10
Payer: MEDICARE

## 2024-05-10 PROCEDURE — 97140 MANUAL THERAPY 1/> REGIONS: CPT

## 2024-05-10 PROCEDURE — 97035 APP MDLTY 1+ULTRASOUND EA 15: CPT

## 2024-05-10 NOTE — FLOWSHEET NOTE
[x] [facility]   Outpatient Rehabilitation & Therapy  [address] [facility] [address]  P: [phone]  F: [phone]    Physical Therapy Daily Treatment Note      Date:  5/10/2024  Patient Name:  Duane C Cordray    :  1955  MRN: [id number]  Physician: Keenan Davis DPM                         Insurance: Medicare/[facility] - NO PRIOR AUTH REQUIRED  Medical Diagnosis: R plantar fasciitis                      Rehab Codes: M72.2, M79.672  Onset date: 2023                       Next Dr's appt.: TBD     Visit# / total visits: 3/20  Cancels/No Shows: 0/1    Subjective:  05/10/24   Pain:  [] Yes  [x] No Location: R heel Pain Rating: (0-10 scale) 2/10  Pain altered Tx:  [x] No  [] Yes  Action:    Comments: pt states he awoke w/ more heel pain this AM than he has all week; he states he did not do the rolling on the ice bottle last night    Pt states he has some heel pain when working the accelerator     Objective:  Modalities:  20% US, 3 Mhz, 1.5w/cm2, plantar fascia attachment to R heel x 8 mins  Precautions: None  Exercises:  Exercise Reps/ Time Weight/ Level Comments   R gastroc and soleus stretches @ wall        Self plantar fascia stretch R                Myofascial release 20 mins  Gastroc, soleus, calcaneus, and plantar fascia   Manual post talar mobes  5 mins Gr 3    Manual lat calcaneal mobes 5 mins Gr 3     marble  w/ toes 10 x 5\"                 Other: HEP update as below:     Access Code: [email or web address]  URL: [email or web address]  Date: 05/10/2024  Prepared by: Cristopher Cox    Exercises  - Seated [place] Pick-Up with Toes  - 2 x daily - 7 x weekly - 3 sets - 10 reps - 5 secon hold      Specific Instructions for next treatment: advance ROM and soft tissue mobes as appropriate       Assessment: [x] Progressing toward goals. 05/10/24  pt noted improved mobility of ankle and foot after treatment today    [] No change.     [] Other:    [x] Patient would continue to benefit

## 2024-05-10 NOTE — CASE COMMUNICATION
Dr. Davis,    Your referral mentioned iontophoresis for Duane Cordray ( 1955).    Could you please fax us a script for:    IONTOPHORESIS OF DEXAMETHASONE, 2ml, 4mg/ml x 4 ML*MIN?    Thank you for allowing us to participate in the care of your patient.      Sincerely,    Cristopher Cox, PT

## 2024-05-14 ENCOUNTER — HOSPITAL ENCOUNTER (OUTPATIENT)
Age: 69
Setting detail: THERAPIES SERIES
Discharge: HOME | End: 2024-05-14
Payer: MEDICARE

## 2024-05-14 PROCEDURE — 97035 APP MDLTY 1+ULTRASOUND EA 15: CPT

## 2024-05-14 PROCEDURE — 97140 MANUAL THERAPY 1/> REGIONS: CPT

## 2024-05-14 NOTE — FLOWSHEET NOTE
[x] [facility]   Outpatient Rehabilitation & Therapy  [address] [facility] [address]  P: [phone]  F: [phone]    Physical Therapy Daily Treatment Note      Date:  2024  Patient Name:  Duane C Cordray    :  1955  MRN: [id number]  Physician: Keenan Davis DPM                         Insurance: Medicare/[facility] - NO PRIOR AUTH REQUIRED  Medical Diagnosis: R plantar fasciitis                      Rehab Codes: M72.2, M79.672  Onset date: 2023                       Next Dr's appt.: TBD     Visit# / total visits:   Cancels/No Shows: 0/1    Subjective:  24   Pain:  [] Yes  [x] No Location: R heel Pain Rating: (0-10 scale) 2/10  Pain altered Tx:  [x] No  [] Yes  Action:    Comments: Patient reports he has been feeling progressively better since last session. He was able to walk his dog ~ 1 mile without much issue. Reports he worked in the yard all day yesterday (on his feet)- he was a little sore at the end of the day- but much better than he has been (notes he typically wouldn't have been able to attempt yard work a month ago). Compliant with HEP.    Objective:  Modalities:  20% US, 3 Mhz, 1.5w/cm2, plantar fascia attachment to R heel x 8 mins  Precautions: None  Exercises:  Exercise Reps/ Time Weight/ Level Comments   R gastroc and soleus stretches @ wall        Self plantar fascia stretch R                Myofascial release 20 mins  Gastroc, soleus, calcaneus, and plantar fascia   Manual post talar mobes  5 mins Gr 3    Manual lat calcaneal mobes 5 mins Gr 3     marble  w/ toes 10 x 5\" NP    patient doing at home    plantar stretch with towel  10\" x4    manual and instructed with towel use.    Other: HEP update as below:     Access Code: [email or web address]  URL: [email or web address]  Date: 05/10/2024  Prepared by: Cristopher Cox    Exercises  - Seated [place] Pick-Up with Toes  - 2 x daily - 7 x weekly - 3 sets - 10 reps - 5 secon hold      Specific

## 2024-05-15 ENCOUNTER — HOSPITAL ENCOUNTER (OUTPATIENT)
Age: 69
Setting detail: THERAPIES SERIES
Discharge: HOME | End: 2024-05-15
Payer: MEDICARE

## 2024-05-15 PROCEDURE — 97035 APP MDLTY 1+ULTRASOUND EA 15: CPT

## 2024-05-15 PROCEDURE — 97140 MANUAL THERAPY 1/> REGIONS: CPT

## 2024-05-15 NOTE — FLOWSHEET NOTE
[x] [facility]   Outpatient Rehabilitation & Therapy  [address], [place], operated by [facility] [address]  P: [phone]  F: [phone]    Physical Therapy Daily Treatment Note      Date:  5/15/2024  Patient Name:  Duane C Cordray    :  1955  MRN: [id number]  Physician: Keenan Davis DPM                         Insurance: Medicare/[facility] - NO PRIOR AUTH REQUIRED  Medical Diagnosis: R plantar fasciitis                      Rehab Codes: M72.2, M79.672  Onset date: 2023                       Next Dr's appt.: TBD     Visit# / total visits:   Cancels/No Shows: 0/1    Subjective:  05/15/24   Pain:  [] Yes  [x] No Location: R heel Pain Rating: (0-10 scale) 4/10  Pain altered Tx:  [x] No  [] Yes  Action:    Comments: pt states he awoke w/ R heel pain this AM for the first time in a few days; he walked his dog 1 mile this AM w/ an increase in heel pain    Objective:  Modalities:  20% US, 3 Mhz, 1.5w/cm2, plantar fascia attachment to R heel x 8 mins  Precautions: None  Exercises:  Exercise Reps/ Time Weight/ Level Comments   R gastroc and soleus stretches @ wall        Self plantar fascia stretch R                Myofascial release 20 mins  Gastroc, soleus, calcaneus, and plantar fascia   Manual post talar mobes  5 mins Gr 3    Manual lat calcaneal mobes 5 mins Gr 3     marble  w/ toes 10 x 5\" NP    patient doing at home    plantar stretch with towel  10\" x4    manual and instructed with towel use.    Other: pt stated at the start of treatment today that he has not performed his wall calf stretches at home in a day or 2 and he was reminded to continue w/ his HEP       Specific Instructions for next treatment: advance ROM and soft tissue mobes as appropriate       Assessment: [x] Progressing toward goals. 05/15/24  overall improved pain and walking tolerance w/ a flare up of pain this AM; no c/o pain at the end of treatment today    [] No change.     [] Other:    [x] Patient would continue to

## 2024-05-29 ENCOUNTER — HOSPITAL ENCOUNTER (OUTPATIENT)
Age: 69
Setting detail: THERAPIES SERIES
Discharge: HOME | End: 2024-05-29
Payer: MEDICARE

## 2024-05-29 PROCEDURE — 97110 THERAPEUTIC EXERCISES: CPT

## 2024-05-29 PROCEDURE — 97140 MANUAL THERAPY 1/> REGIONS: CPT

## 2024-05-29 NOTE — FLOWSHEET NOTE
Instructions for next treatment: advance ROM and soft tissue mobes as appropriate       Assessment: [x] Progressing toward goals. 05/29/24  overall improved pain and walking tolerance w/o any flare ups of pain this AM; no c/o pain at the end of treatment today. Pt has no remaining scheduled appointments and would like his chart to remain open for about 4 weeks in case he has another flare up. Multiple goals met and pt is feeling great. Educated pt on HEP continuation and educated on morning stretching and using a rolling stick and targeted massage gun for continued home level manual if he would like.     [] No change.     [] Other:    [x] Patient would continue to benefit from skilled physical therapy services in order to: improve walking and driving    GOALS:     STG: (to be met in 10 treatments)  Improve morning pain in R heel upon rising from bed by 50% Pt reports his pain is improving and his stretching HEP is helping. 1/10 at worst. GOAL MET 5/29/24  2.  Pt compliant w/ proper performance of HEP to improve ankle/foot ROM and decrease pain to allow pt to ambulate w/ less pain. Pt shows good return form with HEP. GOAL MET 5/29/24  3. Pt to note less R heel pain when driving. Pt reports he is feeling good when driving for the past week. GOAL MET 5/29/24     LTG: (to be met in 20 treatments)  Improve LEFS score from 15% impaired to 0% impaired Updated LEFS score 5% - 5/29/24  Pt able to walk his dog 1 mile w/o R heel pain limiting him Pt reports he is able to walk his dog for 1 mile w/o pain. GOAL MET 5/29/24  Improve R ankle DF AROM to 10 deg to improve gait mechanics and walking tolerance  5/29/24 DF AROM 8 degrees   Pt to deny R heel pain when getting out of bed in the AM Pt continues to stretch first thing in the morning his pain is much more manageable. GOAL MET 5/29/24                    Patient goals: pain relief and ability to be on feet longer  Pt. Education:  [x] Yes  [] No  [x] Reviewed Prior HEP/Ed -

## 2024-05-30 ENCOUNTER — APPOINTMENT (OUTPATIENT)
Age: 69
End: 2024-05-30
Payer: MEDICARE
